# Patient Record
Sex: MALE | Race: WHITE | Employment: UNEMPLOYED | URBAN - METROPOLITAN AREA
[De-identification: names, ages, dates, MRNs, and addresses within clinical notes are randomized per-mention and may not be internally consistent; named-entity substitution may affect disease eponyms.]

---

## 2020-12-18 ENCOUNTER — HOSPITAL ENCOUNTER (INPATIENT)
Age: 40
LOS: 3 days | Discharge: HOME | End: 2020-12-21
Attending: STUDENT IN AN ORGANIZED HEALTH CARE EDUCATION/TRAINING PROGRAM | Admitting: INTERNAL MEDICINE
Payer: COMMERCIAL

## 2020-12-18 DIAGNOSIS — F10.930 ALCOHOL WITHDRAWAL SYNDROME WITHOUT COMPLICATION (HCC): Primary | ICD-10-CM

## 2020-12-18 PROBLEM — F19.10 SUBSTANCE ABUSE (HCC): Status: ACTIVE | Noted: 2020-12-18

## 2020-12-18 PROBLEM — F10.929 ALCOHOL INTOXICATION (HCC): Status: ACTIVE | Noted: 2020-12-18

## 2020-12-18 LAB
ALBUMIN SERPL-MCNC: 3.9 G/DL (ref 3.5–5)
ALBUMIN/GLOB SERPL: 1.1 {RATIO} (ref 1.1–2.2)
ALP SERPL-CCNC: 102 U/L (ref 45–117)
ALT SERPL-CCNC: 114 U/L (ref 12–78)
AMMONIA PLAS-SCNC: 28 UMOL/L
AMPHET UR QL SCN: NEGATIVE
ANION GAP SERPL CALC-SCNC: 5 MMOL/L (ref 5–15)
APPEARANCE UR: CLEAR
AST SERPL-CCNC: 115 U/L (ref 15–37)
BARBITURATES UR QL SCN: NEGATIVE
BASOPHILS # BLD: 0.1 K/UL (ref 0–0.1)
BASOPHILS NFR BLD: 1 % (ref 0–1)
BENZODIAZ UR QL: POSITIVE
BILIRUB SERPL-MCNC: 0.2 MG/DL (ref 0.2–1)
BILIRUB UR QL: NEGATIVE
BUN SERPL-MCNC: 10 MG/DL (ref 6–20)
BUN/CREAT SERPL: 8 (ref 12–20)
CALCIUM SERPL-MCNC: 8.6 MG/DL (ref 8.5–10.1)
CANNABINOIDS UR QL SCN: NEGATIVE
CHLORIDE SERPL-SCNC: 105 MMOL/L (ref 97–108)
CO2 SERPL-SCNC: 32 MMOL/L (ref 21–32)
COCAINE UR QL SCN: NEGATIVE
COLOR UR: ABNORMAL
COMMENT, HOLDF: NORMAL
CREAT SERPL-MCNC: 1.26 MG/DL (ref 0.7–1.3)
DIFFERENTIAL METHOD BLD: ABNORMAL
DRUG SCRN COMMENT,DRGCM: ABNORMAL
EOSINOPHIL # BLD: 0.1 K/UL (ref 0–0.4)
EOSINOPHIL NFR BLD: 1 % (ref 0–7)
ERYTHROCYTE [DISTWIDTH] IN BLOOD BY AUTOMATED COUNT: 13 % (ref 11.5–14.5)
ETHANOL SERPL-MCNC: 225 MG/DL
FOLATE SERPL-MCNC: 9.6 NG/ML (ref 5–21)
GLOBULIN SER CALC-MCNC: 3.7 G/DL (ref 2–4)
GLUCOSE SERPL-MCNC: 107 MG/DL (ref 65–100)
GLUCOSE UR STRIP.AUTO-MCNC: 100 MG/DL
HCT VFR BLD AUTO: 47.3 % (ref 36.6–50.3)
HGB BLD-MCNC: 15.8 G/DL (ref 12.1–17)
HGB UR QL STRIP: NEGATIVE
IMM GRANULOCYTES # BLD AUTO: 0 K/UL (ref 0–0.04)
IMM GRANULOCYTES NFR BLD AUTO: 1 % (ref 0–0.5)
INR PPP: 1 (ref 0.9–1.1)
KETONES UR QL STRIP.AUTO: NEGATIVE MG/DL
LEUKOCYTE ESTERASE UR QL STRIP.AUTO: NEGATIVE
LYMPHOCYTES # BLD: 2.4 K/UL (ref 0.8–3.5)
LYMPHOCYTES NFR BLD: 35 % (ref 12–49)
MAGNESIUM SERPL-MCNC: 2.3 MG/DL (ref 1.6–2.4)
MCH RBC QN AUTO: 33.8 PG (ref 26–34)
MCHC RBC AUTO-ENTMCNC: 33.4 G/DL (ref 30–36.5)
MCV RBC AUTO: 101.3 FL (ref 80–99)
METHADONE UR QL: NEGATIVE
MONOCYTES # BLD: 0.5 K/UL (ref 0–1)
MONOCYTES NFR BLD: 7 % (ref 5–13)
NEUTS SEG # BLD: 3.8 K/UL (ref 1.8–8)
NEUTS SEG NFR BLD: 55 % (ref 32–75)
NITRITE UR QL STRIP.AUTO: NEGATIVE
NRBC # BLD: 0 K/UL (ref 0–0.01)
NRBC BLD-RTO: 0 PER 100 WBC
OPIATES UR QL: NEGATIVE
PCP UR QL: NEGATIVE
PH UR STRIP: 6 [PH] (ref 5–8)
PLATELET # BLD AUTO: 237 K/UL (ref 150–400)
PMV BLD AUTO: 8.5 FL (ref 8.9–12.9)
POTASSIUM SERPL-SCNC: 3.9 MMOL/L (ref 3.5–5.1)
PROT SERPL-MCNC: 7.6 G/DL (ref 6.4–8.2)
PROT UR STRIP-MCNC: NEGATIVE MG/DL
PROTHROMBIN TIME: 10.4 SEC (ref 9–11.1)
RBC # BLD AUTO: 4.67 M/UL (ref 4.1–5.7)
SAMPLES BEING HELD,HOLD: NORMAL
SODIUM SERPL-SCNC: 142 MMOL/L (ref 136–145)
SP GR UR REFRACTOMETRY: 1.01 (ref 1–1.03)
UROBILINOGEN UR QL STRIP.AUTO: 0.2 EU/DL (ref 0.2–1)
VIT B12 SERPL-MCNC: 547 PG/ML (ref 193–986)
WBC # BLD AUTO: 6.8 K/UL (ref 4.1–11.1)

## 2020-12-18 PROCEDURE — 85610 PROTHROMBIN TIME: CPT

## 2020-12-18 PROCEDURE — 65660000000 HC RM CCU STEPDOWN

## 2020-12-18 PROCEDURE — 83735 ASSAY OF MAGNESIUM: CPT

## 2020-12-18 PROCEDURE — 74011000250 HC RX REV CODE- 250: Performed by: STUDENT IN AN ORGANIZED HEALTH CARE EDUCATION/TRAINING PROGRAM

## 2020-12-18 PROCEDURE — 82746 ASSAY OF FOLIC ACID SERUM: CPT

## 2020-12-18 PROCEDURE — 85025 COMPLETE CBC W/AUTO DIFF WBC: CPT

## 2020-12-18 PROCEDURE — 81003 URINALYSIS AUTO W/O SCOPE: CPT

## 2020-12-18 PROCEDURE — 36415 COLL VENOUS BLD VENIPUNCTURE: CPT

## 2020-12-18 PROCEDURE — 99285 EMERGENCY DEPT VISIT HI MDM: CPT

## 2020-12-18 PROCEDURE — 96375 TX/PRO/DX INJ NEW DRUG ADDON: CPT

## 2020-12-18 PROCEDURE — 74011250636 HC RX REV CODE- 250/636: Performed by: STUDENT IN AN ORGANIZED HEALTH CARE EDUCATION/TRAINING PROGRAM

## 2020-12-18 PROCEDURE — 82140 ASSAY OF AMMONIA: CPT

## 2020-12-18 PROCEDURE — 80307 DRUG TEST PRSMV CHEM ANLYZR: CPT

## 2020-12-18 PROCEDURE — 74011250637 HC RX REV CODE- 250/637: Performed by: INTERNAL MEDICINE

## 2020-12-18 PROCEDURE — 96374 THER/PROPH/DIAG INJ IV PUSH: CPT

## 2020-12-18 PROCEDURE — 74011250636 HC RX REV CODE- 250/636: Performed by: INTERNAL MEDICINE

## 2020-12-18 PROCEDURE — 74011250636 HC RX REV CODE- 250/636

## 2020-12-18 PROCEDURE — 80053 COMPREHEN METABOLIC PANEL: CPT

## 2020-12-18 PROCEDURE — 82607 VITAMIN B-12: CPT

## 2020-12-18 RX ORDER — DIAZEPAM 10 MG/2ML
10 INJECTION INTRAMUSCULAR
Status: COMPLETED | OUTPATIENT
Start: 2020-12-18 | End: 2020-12-18

## 2020-12-18 RX ORDER — ENOXAPARIN SODIUM 100 MG/ML
40 INJECTION SUBCUTANEOUS DAILY
Status: DISCONTINUED | OUTPATIENT
Start: 2020-12-19 | End: 2020-12-21 | Stop reason: HOSPADM

## 2020-12-18 RX ORDER — SODIUM CHLORIDE 0.9 % (FLUSH) 0.9 %
5-40 SYRINGE (ML) INJECTION AS NEEDED
Status: DISCONTINUED | OUTPATIENT
Start: 2020-12-18 | End: 2020-12-21 | Stop reason: HOSPADM

## 2020-12-18 RX ORDER — ONDANSETRON 2 MG/ML
4 INJECTION INTRAMUSCULAR; INTRAVENOUS
Status: DISCONTINUED | OUTPATIENT
Start: 2020-12-18 | End: 2020-12-21 | Stop reason: HOSPADM

## 2020-12-18 RX ORDER — ESCITALOPRAM OXALATE 20 MG/1
25 TABLET ORAL DAILY
COMMUNITY

## 2020-12-18 RX ORDER — SODIUM CHLORIDE 0.9 % (FLUSH) 0.9 %
5-40 SYRINGE (ML) INJECTION EVERY 8 HOURS
Status: DISCONTINUED | OUTPATIENT
Start: 2020-12-18 | End: 2020-12-21 | Stop reason: HOSPADM

## 2020-12-18 RX ORDER — DEXMEDETOMIDINE HYDROCHLORIDE 4 UG/ML
.1-1.5 INJECTION, SOLUTION INTRAVENOUS
Status: DISCONTINUED | OUTPATIENT
Start: 2020-12-18 | End: 2020-12-21 | Stop reason: HOSPADM

## 2020-12-18 RX ORDER — ACETAMINOPHEN 650 MG/1
650 SUPPOSITORY RECTAL
Status: DISCONTINUED | OUTPATIENT
Start: 2020-12-18 | End: 2020-12-18

## 2020-12-18 RX ORDER — POLYETHYLENE GLYCOL 3350 17 G/17G
17 POWDER, FOR SOLUTION ORAL DAILY PRN
Status: DISCONTINUED | OUTPATIENT
Start: 2020-12-18 | End: 2020-12-21 | Stop reason: HOSPADM

## 2020-12-18 RX ORDER — ACETAMINOPHEN 325 MG/1
650 TABLET ORAL
Status: DISCONTINUED | OUTPATIENT
Start: 2020-12-18 | End: 2020-12-18

## 2020-12-18 RX ORDER — LANOLIN ALCOHOL/MO/W.PET/CERES
100 CREAM (GRAM) TOPICAL DAILY
Status: DISCONTINUED | OUTPATIENT
Start: 2020-12-19 | End: 2020-12-18

## 2020-12-18 RX ORDER — LORAZEPAM 2 MG/ML
2 INJECTION INTRAMUSCULAR ONCE
Status: DISCONTINUED | OUTPATIENT
Start: 2020-12-18 | End: 2020-12-18

## 2020-12-18 RX ORDER — LORAZEPAM 2 MG/ML
INJECTION INTRAMUSCULAR
Status: COMPLETED
Start: 2020-12-18 | End: 2020-12-18

## 2020-12-18 RX ORDER — LORAZEPAM 2 MG/ML
2 INJECTION INTRAMUSCULAR ONCE
Status: COMPLETED | OUTPATIENT
Start: 2020-12-18 | End: 2020-12-18

## 2020-12-18 RX ORDER — DIAZEPAM 10 MG/2ML
20 INJECTION INTRAMUSCULAR
Status: DISCONTINUED | OUTPATIENT
Start: 2020-12-18 | End: 2020-12-20

## 2020-12-18 RX ORDER — DIAZEPAM 10 MG/2ML
10 INJECTION INTRAMUSCULAR
Status: DISCONTINUED | OUTPATIENT
Start: 2020-12-18 | End: 2020-12-20

## 2020-12-18 RX ORDER — CHLORDIAZEPOXIDE HYDROCHLORIDE 25 MG/1
25 CAPSULE, GELATIN COATED ORAL 3 TIMES DAILY
Status: DISCONTINUED | OUTPATIENT
Start: 2020-12-18 | End: 2020-12-21

## 2020-12-18 RX ORDER — FOLIC ACID 1 MG/1
1 TABLET ORAL DAILY
Status: DISCONTINUED | OUTPATIENT
Start: 2020-12-19 | End: 2020-12-18

## 2020-12-18 RX ORDER — IBUPROFEN 200 MG
1 TABLET ORAL DAILY
Status: DISCONTINUED | OUTPATIENT
Start: 2020-12-18 | End: 2020-12-21 | Stop reason: HOSPADM

## 2020-12-18 RX ORDER — SODIUM CHLORIDE 9 MG/ML
100 INJECTION, SOLUTION INTRAVENOUS CONTINUOUS
Status: DISCONTINUED | OUTPATIENT
Start: 2020-12-18 | End: 2020-12-20

## 2020-12-18 RX ORDER — PROMETHAZINE HYDROCHLORIDE 25 MG/1
12.5 TABLET ORAL
Status: DISCONTINUED | OUTPATIENT
Start: 2020-12-18 | End: 2020-12-21 | Stop reason: HOSPADM

## 2020-12-18 RX ORDER — DEXTROAMPHETAMINE SACCHARATE, AMPHETAMINE ASPARTATE, DEXTROAMPHETAMINE SULFATE AND AMPHETAMINE SULFATE 7.5; 7.5; 7.5; 7.5 MG/1; MG/1; MG/1; MG/1
30 TABLET ORAL
COMMUNITY

## 2020-12-18 RX ORDER — ONDANSETRON 2 MG/ML
4 INJECTION INTRAMUSCULAR; INTRAVENOUS
Status: COMPLETED | OUTPATIENT
Start: 2020-12-18 | End: 2020-12-18

## 2020-12-18 RX ADMIN — Medication 10 ML: at 22:43

## 2020-12-18 RX ADMIN — LORAZEPAM 2 MG: 2 INJECTION INTRAMUSCULAR at 14:53

## 2020-12-18 RX ADMIN — FOLIC ACID: 5 INJECTION, SOLUTION INTRAMUSCULAR; INTRAVENOUS; SUBCUTANEOUS at 16:58

## 2020-12-18 RX ADMIN — CHLORDIAZEPOXIDE HYDROCHLORIDE 25 MG: 25 CAPSULE ORAL at 22:31

## 2020-12-18 RX ADMIN — DIAZEPAM 10 MG: 5 INJECTION, SOLUTION INTRAMUSCULAR; INTRAVENOUS at 20:12

## 2020-12-18 RX ADMIN — LORAZEPAM 2 MG: 2 INJECTION INTRAMUSCULAR; INTRAVENOUS at 14:53

## 2020-12-18 RX ADMIN — SODIUM CHLORIDE 100 ML/HR: 9 INJECTION, SOLUTION INTRAVENOUS at 19:57

## 2020-12-18 RX ADMIN — DIAZEPAM 10 MG: 5 INJECTION, SOLUTION INTRAMUSCULAR; INTRAVENOUS at 17:24

## 2020-12-18 RX ADMIN — CHLORDIAZEPOXIDE HYDROCHLORIDE 25 MG: 25 CAPSULE ORAL at 16:58

## 2020-12-18 RX ADMIN — DIAZEPAM 10 MG: 5 INJECTION, SOLUTION INTRAMUSCULAR; INTRAVENOUS at 15:03

## 2020-12-18 RX ADMIN — ONDANSETRON 4 MG: 2 INJECTION INTRAMUSCULAR; INTRAVENOUS at 15:03

## 2020-12-18 RX ADMIN — Medication 10 ML: at 16:58

## 2020-12-18 NOTE — H&P
[address] [facility] Adult  Hospitalist Group  History and Physical    Primary Care Provider: Yany Grant MD  Date of Service:  12/18/2020    Subjective:     Doc Castano is a 41 y.o. male otherwise healthy, with history of alcohol and cocaine abuse, sober for past 2 years came today with alcohol relapse for possible withdrawal due to intoxication going on with alcohol abuse for 1 week. Apparently patient had divorce with his wife and for last 5-6 months since he started drinking. Patient had DTs once about 5 to 6 months ago. Patient started drinking again about a week ago. Apparently patient has been drinking 25+ drinks per day of wine/liquor and beer with last drink this morning around 9 AM.  Also patient has been using cocaine, last use about 2 days ago. With significant shaking, tremulousness, sweating, nausea, but no vomiting/ diarrhea, some dizziness and muscle cramping since morning, patient decided to come to emergency department for further evaluation and help. Patient has history of DTs without seizures in the past along with hallucination with alcohol withdrawal but denies anything of the sort at this time. Usually his withdrawals last about 5 days. Patient denies any previous ICU hospitalizations for the same however he had multiple hospitalizations for alcohol withdrawal as mentioned before. Patient was resting quietly from the benzodiazepine IV he had received in the ED. Easily arousable. Started feeling tremulous again. Denied any history of liver disease or cirrhosis before. Review of Systems:    A comprehensive review of systems was negative except for that written in the History of Present Illness.      Past Medical History:   Diagnosis Date    ADHD (attention deficit hyperactivity disorder) 9/15/2011    Depression     anxiety      Past Surgical History:   Procedure Laterality Date    HX HEENT      ear     Prior to Admission medications    Medication Sig Start Date End Date Taking? Authorizing Provider   FLUoxetine (PROZAC) 20 mg capsule TAKE ONE CAPSULE BY MOUTH EVERY DAY 3/14/13   Shana Zaragoza MD   methylphenidate (RITALIN) 20 mg tablet Take 1 Tab by mouth two (2) times a day. 9/15/11   Shana Zaragoza MD     Allergies   Allergen Reactions    Sulfa (Sulfonamide Antibiotics) Unknown (comments)      History reviewed. No pertinent family history. SOCIAL HISTORY:  Patient resides at Home. Patient ambulates with no assistance. Smoking history: Everyday smoker, 12 PPD  Alcohol history: Heavy alcohol abuse. Quit 2 years ago. Relapsed about 5 to 6 months ago with some pause in between and again restarted about a week ago. Recently moved from [place] to [place] as his parents live here. Objective:       Physical Exam:   Visit Vitals  BP (!) 141/94   Pulse (!) 121   Temp 97.9 °F (36.6 °C)   Resp 22   Wt 63.5 kg (140 lb)   SpO2 98%   BMI 22.60 kg/m²       General:          Alert, cooperative, no distress, appears stated age. Mild tremulous. HEENT:           Atraumatic, anicteric sclerae, pink conjunctivae                          No oral ulcers, mucosa dry, throat clear, dentition poor  Neck:               Supple, symmetrical  Lungs:             Clear to auscultation bilaterally. No Wheezing or Rhonchi. No rales. Chest wall:      No tenderness  No Accessory muscle use. Heart:              Regular  rhythm,  No  murmur   No edema  Abdomen:        Soft, non-tender. Not distended. Bowel sounds normal  Extremities:     No cyanosis. No clubbing,                            Skin turgor normal, Capillary refill normal  Skin:                Not pale. Not Jaundiced  No rashes. Mild spiders over upper chest  Psych:             Not anxious or agitated.   Neurologic:      Alert, moves all extremities, answers questions appropriately and responds to commands     Cap refill: Brisk, less than 3 seconds  Pulses: 2+, symmetric in all extremities      Data Review: All diagnostic labs and studies have been reviewed. Recent Results (from the past 24 hour(s))   CBC WITH AUTOMATED DIFF    Collection Time: 12/18/20  2:46 PM   Result Value Ref Range    WBC 6.8 4.1 - 11.1 K/uL    RBC 4.67 4.10 - 5.70 M/uL    HGB 15.8 12.1 - 17.0 g/dL    HCT 47.3 36.6 - 50.3 %    .3 (H) 80.0 - 99.0 FL    MCH 33.8 26.0 - 34.0 PG    MCHC 33.4 30.0 - 36.5 g/dL    RDW 13.0 11.5 - 14.5 %    PLATELET 298 868 - 886 K/uL    MPV 8.5 (L) 8.9 - 12.9 FL    NRBC 0.0 0  WBC    ABSOLUTE NRBC 0.00 0.00 - 0.01 K/uL    NEUTROPHILS 55 32 - 75 %    LYMPHOCYTES 35 12 - 49 %    MONOCYTES 7 5 - 13 %    EOSINOPHILS 1 0 - 7 %    BASOPHILS 1 0 - 1 %    IMMATURE GRANULOCYTES 1 (H) 0.0 - 0.5 %    ABS. NEUTROPHILS 3.8 1.8 - 8.0 K/UL    ABS. LYMPHOCYTES 2.4 0.8 - 3.5 K/UL    ABS. MONOCYTES 0.5 0.0 - 1.0 K/UL    ABS. EOSINOPHILS 0.1 0.0 - 0.4 K/UL    ABS. BASOPHILS 0.1 0.0 - 0.1 K/UL    ABS. IMM. GRANS. 0.0 0.00 - 0.04 K/UL    DF AUTOMATED     SAMPLES BEING HELD    Collection Time: 12/18/20  2:46 PM   Result Value Ref Range    SAMPLES BEING HELD 1 miguel     COMMENT        Add-on orders for these samples will be processed based on acceptable specimen integrity and analyte stability, which may vary by analyte.    ETHYL ALCOHOL    Collection Time: 12/18/20  2:46 PM   Result Value Ref Range    ALCOHOL(ETHYL),SERUM 225 (H) <38 MG/DL   METABOLIC PANEL, COMPREHENSIVE    Collection Time: 12/18/20  2:46 PM   Result Value Ref Range    Sodium 142 136 - 145 mmol/L    Potassium 3.9 3.5 - 5.1 mmol/L    Chloride 105 97 - 108 mmol/L    CO2 32 21 - 32 mmol/L    Anion gap 5 5 - 15 mmol/L    Glucose 107 (H) 65 - 100 mg/dL    BUN 10 6 - 20 MG/DL    Creatinine 1.26 0.70 - 1.30 MG/DL    BUN/Creatinine ratio 8 (L) 12 - 20      GFR est AA >60 >60 ml/min/1.73m2    GFR est non-AA >60 >60 ml/min/1.73m2    Calcium 8.6 8.5 - 10.1 MG/DL    Bilirubin, total 0.2 0.2 - 1.0 MG/DL    ALT (SGPT) 114 (H) 12 - 78 U/L    AST (SGOT) 115 (H) 15 - 37 U/L Alk. phosphatase 102 45 - 117 U/L    Protein, total 7.6 6.4 - 8.2 g/dL    Albumin 3.9 3.5 - 5.0 g/dL    Globulin 3.7 2.0 - 4.0 g/dL    A-G Ratio 1.1 1.1 - 2.2     MAGNESIUM    Collection Time: 12/18/20  2:46 PM   Result Value Ref Range    Magnesium 2.3 1.6 - 2.4 mg/dL     Radiology  No results found. Assessment:     Active Problems:    Alcohol intoxication ([facility] [address] (12/18/2020)      Substance abuse ([facility] (12/18/2020)      #Heavy alcohol use, intoxication with withdrawal symptoms since morning. Last drink this morning  #Cocaine abuse, last 2 days ago  #Social distress due to recent divorce  #Elevated transaminases, likely alcoholic hepatitis related  #Macrocytosis    Plan:     -Admit to inpatient, telemetry, low threshold for transfer to higher level of careICU if patient continues to withdrawal heavily and not controlled with IV benzodiazepine boluses  -CIWA protocol with as needed Valium  -Schedule Librium 25 mg p.o. 3 times daily  -Banana bag IV with multivitamin 100 mL/h along with normal saline at 100 mL/h  -UDS  -CBC, CMP, mag, Phos daily  -If uncontrolled, may need IV Precedex gtt. with transfer to ICU for close monitoring  -Check B12, folate levels  -Nicotine patch  -Case management consultation, psychiatry consultation  -PT/OT  -Avoid beta-blocker  -May need as needed blood pressure medications. Will consider hydralazine IV 10 mg every 6 hours as needed for SBP greater than 170    CODE STATUS: Full code  DVT prophylaxis: Lovenox  MPOA/emergency contact: Mother, Ms. Chioma Ojeda [phone].     FUNCTIONAL STATUS PRIOR TO HOSPITALIZATION Ambulates Independently (including history of recent falls)     Signed By: Néstor Hansen MD     December 18, 2020

## 2020-12-18 NOTE — ED PROVIDER NOTES
Patient is a 68-year-old male presenting today with follow-up. Patient is an alcoholic but was sober for 2 years before 1 week ago. he and his wife got a divorce 1 week ago and since then he has been drinking around-the-clock. States he has 25+ drinks per day of alcohol (wine/liquor and beer). Patient had his last drink this morning at 9 AM.  He has also been using cocaine, last used 2 days ago. He reports severe shaking, diaphoresis, nausea, vomiting, diarrhea, dizziness and muscle cramping since this morning. Has history of seizures and hallucinations in the past but denies these today. Past Medical History:   Diagnosis Date    ADHD (attention deficit hyperactivity disorder) 9/15/2011    Depression     anxiety       Past Surgical History:   Procedure Laterality Date    HX HEENT      ear         History reviewed. No pertinent family history. Social History     Socioeconomic History    Marital status: SINGLE     Spouse name: Not on file    Number of children: Not on file    Years of education: Not on file    Highest education level: Not on file   Occupational History    Not on file   Social Needs    Financial resource strain: Not on file    Food insecurity     Worry: Not on file     Inability: Not on file    Transportation needs     Medical: Not on file     Non-medical: Not on file   Tobacco Use    Smoking status: Current Every Day Smoker    Smokeless tobacco: Former User   Substance and Sexual Activity    Alcohol use:  Yes    Drug use: No    Sexual activity: Not on file   Lifestyle    Physical activity     Days per week: Not on file     Minutes per session: Not on file    Stress: Not on file   Relationships    Social connections     Talks on phone: Not on file     Gets together: Not on file     Attends Scientologist service: Not on file     Active member of club or organization: Not on file     Attends meetings of clubs or organizations: Not on file     Relationship status: Not on file    Intimate partner violence     Fear of current or ex partner: Not on file     Emotionally abused: Not on file     Physically abused: Not on file     Forced sexual activity: Not on file   Other Topics Concern    Not on file   Social History Narrative    Not on file         ALLERGIES: Sulfa (sulfonamide antibiotics)    Review of Systems   Constitutional: Positive for chills and diaphoresis. Negative for fever. HENT: Negative for congestion and sore throat. Eyes: Negative for pain and redness. Respiratory: Negative for cough and shortness of breath. Cardiovascular: Positive for palpitations. Negative for chest pain. Gastrointestinal: Positive for diarrhea, nausea and vomiting. Negative for abdominal pain. Genitourinary: Negative for frequency and hematuria. Musculoskeletal: Negative for back pain and neck pain. Skin: Negative for rash and wound. Neurological: Positive for dizziness. Negative for headaches. Hematological: Does not bruise/bleed easily. Vitals:    12/18/20 1429 12/18/20 1431   Pulse: (!) 121    Resp: 22    Temp:  97.9 °F (36.6 °C)   SpO2: 98%             Physical Exam  Vitals signs and nursing note reviewed. Constitutional:       General: He is not in acute distress. Appearance: He is well-developed. He is ill-appearing and diaphoretic. HENT:      Head: Normocephalic and atraumatic. Eyes:      Conjunctiva/sclera: Conjunctivae normal.      Pupils: Pupils are equal, round, and reactive to light. Neck:      Musculoskeletal: Normal range of motion and neck supple. Cardiovascular:      Rate and Rhythm: Regular rhythm. Tachycardia present. Heart sounds: Normal heart sounds. No murmur. No friction rub. No gallop. Pulmonary:      Effort: Pulmonary effort is normal. No respiratory distress. Breath sounds: Normal breath sounds. No wheezing or rales. Abdominal:      General: Bowel sounds are normal. There is no distension.       Palpations: Abdomen is soft.      Tenderness: There is no abdominal tenderness. There is no guarding or rebound. Musculoskeletal: Normal range of motion. Comments: Spasming of feet and wrists   Skin:     General: Skin is warm. Capillary Refill: Capillary refill takes less than 2 seconds. Findings: No rash. Neurological:      Mental Status: He is alert and oriented to person, place, and time. Psychiatric:      Comments: Very anxious  Tremulous          Labs Reviewed:   No leukocytosis  No anemia  Alcohol elevated to 225  Renal function and electrolytes look okay  Mild elevation in LFTs    Imaging Reviewed: N/A      Course:  Ativan 2mg IV given without improvement  Valium 10mg IV ordered. Attempted to order Precedex however no weight in chart yet. 4:04 PM patient still with anxiety, nausea, tremor. Appears much better. HR improved. Will hold Precedex for right now, give additional ativan.     4:10 PM prior to him getting ativan I re-evaluated. He is sleepy. HR ok. Will hold for now. 4:18 PM pt re-evaluated. Feeling much better. Sleepy but easily wakes with verbal stim. Perfect Serve Consult for Admission  4:18 PM    ED Room Number: ER10/10  Patient Name and age:  July Bravo 41 y.o.  male  Working Diagnosis:   1. Alcohol withdrawal syndrome without complication ([facility] [address]        COVID-19 Suspicion:  no  Sepsis present:  no  Reassessment needed: no  Code Status:  Full Code  Readmission: no  Isolation Requirements:  no  Recommended Level of Care:  step down  Department:[facility] Adult ED - 21   Other:  41 y.o. male etoh w/d. Received ativan 2mg + valium 10mg IV. Appears much better now. MDM:  68-year-old male presenting with concern for alcohol withdrawal.  He has been drinking heavily for the past 1 week. Has prior history of seizures and hallucinations but not today. He is given Ativan without significant provement followed by Valium 10 mg IV which did seem to help.   I had initially ordered Precedex but he did not end up requiring it. Patient's vital signs improved drastically with medications as above. He received a banana bag but did not require any other electrolyte replacement. He will be admitted to the hospital service for further management. Clinical Impression:     ICD-10-CM ICD-9-CM    1.  Alcohol withdrawal syndrome without complication (HCC)  Q94.102 291.81            Disposition: Admit  Bruno Javier,

## 2020-12-18 NOTE — ED TRIAGE NOTES
Patient reports symptoms of etoh withdrawal.  Rapid heart rate, shaking, nausea.  Last drink at 9 am this morning

## 2020-12-18 NOTE — ED NOTES
Upon entering pt room to start precedex, pt found asleep. Pt awoke after loud verbal stimuli. Dr. Dion Bernardo notified and precedex held.

## 2020-12-19 ENCOUNTER — APPOINTMENT (OUTPATIENT)
Dept: ULTRASOUND IMAGING | Age: 40
End: 2020-12-19
Attending: INTERNAL MEDICINE
Payer: COMMERCIAL

## 2020-12-19 LAB
ALBUMIN SERPL-MCNC: 3.2 G/DL (ref 3.5–5)
ALBUMIN/GLOB SERPL: 1.1 {RATIO} (ref 1.1–2.2)
ALP SERPL-CCNC: 84 U/L (ref 45–117)
ALT SERPL-CCNC: 85 U/L (ref 12–78)
ANION GAP SERPL CALC-SCNC: 6 MMOL/L (ref 5–15)
AST SERPL-CCNC: 77 U/L (ref 15–37)
BILIRUB SERPL-MCNC: 0.5 MG/DL (ref 0.2–1)
BUN SERPL-MCNC: 8 MG/DL (ref 6–20)
BUN/CREAT SERPL: 9 (ref 12–20)
CALCIUM SERPL-MCNC: 7.7 MG/DL (ref 8.5–10.1)
CHLORIDE SERPL-SCNC: 110 MMOL/L (ref 97–108)
CO2 SERPL-SCNC: 26 MMOL/L (ref 21–32)
CREAT SERPL-MCNC: 0.88 MG/DL (ref 0.7–1.3)
ERYTHROCYTE [DISTWIDTH] IN BLOOD BY AUTOMATED COUNT: 13.1 % (ref 11.5–14.5)
GLOBULIN SER CALC-MCNC: 2.9 G/DL (ref 2–4)
GLUCOSE SERPL-MCNC: 86 MG/DL (ref 65–100)
HCT VFR BLD AUTO: 38.9 % (ref 36.6–50.3)
HGB BLD-MCNC: 13.3 G/DL (ref 12.1–17)
MAGNESIUM SERPL-MCNC: 1.8 MG/DL (ref 1.6–2.4)
MCH RBC QN AUTO: 33.9 PG (ref 26–34)
MCHC RBC AUTO-ENTMCNC: 34.2 G/DL (ref 30–36.5)
MCV RBC AUTO: 99.2 FL (ref 80–99)
NRBC # BLD: 0 K/UL (ref 0–0.01)
NRBC BLD-RTO: 0 PER 100 WBC
PHOSPHATE SERPL-MCNC: 2.6 MG/DL (ref 2.6–4.7)
PLATELET # BLD AUTO: 158 K/UL (ref 150–400)
PMV BLD AUTO: 8.5 FL (ref 8.9–12.9)
POTASSIUM SERPL-SCNC: 3.7 MMOL/L (ref 3.5–5.1)
PROT SERPL-MCNC: 6.1 G/DL (ref 6.4–8.2)
RBC # BLD AUTO: 3.92 M/UL (ref 4.1–5.7)
SODIUM SERPL-SCNC: 142 MMOL/L (ref 136–145)
WBC # BLD AUTO: 5.2 K/UL (ref 4.1–11.1)

## 2020-12-19 PROCEDURE — 65660000000 HC RM CCU STEPDOWN

## 2020-12-19 PROCEDURE — 84100 ASSAY OF PHOSPHORUS: CPT

## 2020-12-19 PROCEDURE — 83735 ASSAY OF MAGNESIUM: CPT

## 2020-12-19 PROCEDURE — 74011250637 HC RX REV CODE- 250/637: Performed by: INTERNAL MEDICINE

## 2020-12-19 PROCEDURE — 74011250636 HC RX REV CODE- 250/636: Performed by: INTERNAL MEDICINE

## 2020-12-19 PROCEDURE — 36415 COLL VENOUS BLD VENIPUNCTURE: CPT

## 2020-12-19 PROCEDURE — 76705 ECHO EXAM OF ABDOMEN: CPT

## 2020-12-19 PROCEDURE — 80053 COMPREHEN METABOLIC PANEL: CPT

## 2020-12-19 PROCEDURE — 85027 COMPLETE CBC AUTOMATED: CPT

## 2020-12-19 PROCEDURE — 74011000250 HC RX REV CODE- 250: Performed by: INTERNAL MEDICINE

## 2020-12-19 RX ORDER — FLUOXETINE HYDROCHLORIDE 20 MG/1
20 CAPSULE ORAL DAILY
Status: DISCONTINUED | OUTPATIENT
Start: 2020-12-19 | End: 2020-12-21 | Stop reason: HOSPADM

## 2020-12-19 RX ADMIN — CHLORDIAZEPOXIDE HYDROCHLORIDE 25 MG: 25 CAPSULE ORAL at 16:55

## 2020-12-19 RX ADMIN — Medication 10 ML: at 06:13

## 2020-12-19 RX ADMIN — ENOXAPARIN SODIUM 40 MG: 100 INJECTION SUBCUTANEOUS at 10:23

## 2020-12-19 RX ADMIN — DIAZEPAM 10 MG: 5 INJECTION, SOLUTION INTRAMUSCULAR; INTRAVENOUS at 03:23

## 2020-12-19 RX ADMIN — CHLORDIAZEPOXIDE HYDROCHLORIDE 25 MG: 25 CAPSULE ORAL at 22:59

## 2020-12-19 RX ADMIN — DIAZEPAM 10 MG: 5 INJECTION, SOLUTION INTRAMUSCULAR; INTRAVENOUS at 20:37

## 2020-12-19 RX ADMIN — THIAMINE HYDROCHLORIDE: 100 INJECTION, SOLUTION INTRAMUSCULAR; INTRAVENOUS at 10:27

## 2020-12-19 RX ADMIN — FLUOXETINE 20 MG: 20 CAPSULE ORAL at 10:24

## 2020-12-19 RX ADMIN — ONDANSETRON 4 MG: 2 INJECTION INTRAMUSCULAR; INTRAVENOUS at 03:22

## 2020-12-19 RX ADMIN — SODIUM CHLORIDE 100 ML/HR: 9 INJECTION, SOLUTION INTRAVENOUS at 06:12

## 2020-12-19 RX ADMIN — CHLORDIAZEPOXIDE HYDROCHLORIDE 25 MG: 25 CAPSULE ORAL at 10:24

## 2020-12-19 NOTE — PROGRESS NOTES
Orders received and chart reviewed. Per nurse, pt is young and was walking independently prior to admission. He has been mobile while here, and she does not anticipate he will have issues resuming his prior abilities. Order completed at this time. Please reconsult if pt has a decline in function or demonstrates decreased balance once up. Recommend OOB 3x/day for meals and to restroom. Thank you.

## 2020-12-19 NOTE — PROGRESS NOTES
Problem: Falls - Risk of  Goal: *Absence of Falls  Description: Document Artur Nelson Fall Risk and appropriate interventions in the flowsheet.   12/18/2020 2213 by Shawna Chan  Outcome: Progressing Towards Goal  Note: Fall Risk Interventions:  Mobility Interventions: Patient to call before getting OOB         Medication Interventions: Bed/chair exit alarm, Patient to call before getting OOB                12/18/2020 2212 by Johanna DHALIWAL  Outcome: Progressing Towards Goal  Note: Fall Risk Interventions:  Mobility Interventions: Patient to call before getting OOB         Medication Interventions: Bed/chair exit alarm, Patient to call before getting OOB                   Problem: Patient Education: Go to Patient Education Activity  Goal: Patient/Family Education  Outcome: Progressing Towards Goal     Problem: Alcohol Withdrawal  Goal: *STG: Participates in treatment plan  Outcome: Progressing Towards Goal  Goal: *STG: Remains safe in hospital  Outcome: Progressing Towards Goal  Goal: *STG: Seeks staff when symptoms of withdrawal increase  Outcome: Progressing Towards Goal  Goal: *STG: Complies with medication therapy  Outcome: Progressing Towards Goal  Goal: *STG: Will identify negative impact of chemical dependency including the use of tobacco, alcohol, and other substances  Outcome: Progressing Towards Goal  Goal: *STG: Verbalizes abstinence as an achievable goal  Outcome: Progressing Towards Goal  Goal: *STG: Able to indentify relapse triggers including interpersonal/social and familial factors  Outcome: Progressing Towards Goal  Goal: *STG: Identify lifestyle changes to support long term sobriety such as vocation, employment, education, and legal issues  Outcome: Progressing Towards Goal  Goal: *STG: Maintains appropriate nutrition and hydration  Outcome: Progressing Towards Goal  Goal: *STG: Vital signs within defined limits  Outcome: Progressing Towards Goal  Goal: *STG/LTG: Relapse prevention plan in place to include housing/aftercare, leisure activities, and spirituality  Outcome: Progressing Towards Goal  Goal: Interventions  Outcome: Progressing Towards Goal     Problem: Patient Education: Go to Patient Education Activity  Goal: Patient/Family Education  Outcome: Progressing Towards Goal

## 2020-12-19 NOTE — PROGRESS NOTES
[address] [facility] Adult  Hospitalist Group                                                                                          Hospitalist Progress Note  Sangita Lo MD  Answering service: [phone] from in house phone        Date of Service:  2020  NAME:  Carlos Manuel Cifuentes  :  1980  MRN:  [id number]      Admission Summary:   Carlos Manuel Cifuentes is a 41 y.o. male otherwise healthy, with history of alcohol and cocaine abuse, sober for past 2 years came today with alcohol relapse for possible withdrawal due to intoxication going on with alcohol abuse for 1 week. Apparently patient had divorce with his wife and for last 5-6 months since he started drinking. Patient had DTs once about 5 to 6 months ago. Patient started drinking again about a week ago. Apparently patient has been drinking 25+ drinks per day of wine/liquor and beer with last drink this morning around 9 AM.  Also patient has been using cocaine, last use about 2 days ago. With significant shaking, tremulousness, sweating, nausea, but no vomiting/ diarrhea, some dizziness and muscle cramping since morning, patient decided to come to emergency department for further evaluation and help. Patient has history of DTs without seizures in the past along with hallucination with alcohol withdrawal but denies anything of the sort at this time. Usually his withdrawals last about 5 days. Patient denies any previous ICU hospitalizations for the same however he had multiple hospitalizations for alcohol withdrawal as mentioned before. Patient was resting quietly from the benzodiazepine IV he had received in the ED. Easily arousable. Started feeling tremulous again. Denied any history of liver disease or cirrhosis before. Interval history / Subjective: Follow up alcohol intoxication, withdrawal.  Patient seen and examined at the bedside. Labs, images and notes reviewed  Discussed with nursing staff, orders reviewed. Plan discussed with patient/Family  Feeling okay. Needed 3 doses of as needed Valium per CIWA. Less tremulous. No appetite. Some nausea but no vomiting. Willing to try CLD. Assessment & Plan:     # Heavy alcohol use, intoxication with withdrawal symptoms since morning of admission. Last drink 12/18/2020 a.m.  -Admitted to inpatient, telemetry  -CIWA protocol with as needed Valium  -Continue Librium 25 mg 3 times daily and taper once clinically stable  -Continue banana bag IV along with IVF normal saline  -Daily CBC, CMP, Mag, Phos monitoringreplete as needed  -If worsens, low threshold for transfer to ICU for IV Precedex gtt. -CM consult for disposition planning  -Psych consult  -Patient already has a sponsor from [facility] where he moved from recently and is in communication virtually/via phone    # Cocaine abuse, last 2 days ago before admission  -UDS unremarkable  -Counseled for abstinence and cessation.   Patient voiced understanding    # Nicotine dependence, daily 12 PPD smoking  -Nicotine replacement therapy/nicotine patch 21 mg / 24 hours  -Counseled for cessation    #Social distress due to recent divorce  -We will await psychiatry input    #Elevated transaminases, likely alcoholic hepatitis related  -Downtrending  -Consult for alcohol abstinence  -CMP monitoring  -RUQ ultrasound    #Macrocytosis, due to alcohol use  -B12 and folate WNL  -MVI replacement, thiamine, folate replacement p.o. once stable    #Mild acute renal insufficiency, improved  -Better with IVF  -Maintain good hydration and encourage p.o. intake    Code status: Full code  DVT prophylaxis: Lovenox    Care Plan discussed with: Patient/Family and Nurse  Anticipated Disposition: Home w/Family  Anticipated Discharge: Greater than 48 hours     Hospital Problems  Date Reviewed: 9/15/2011          Codes Class Noted POA    Alcohol intoxication ([facility] [address] ICD-10-CM: D54.405  ICD-9-CM: 305.00  12/18/2020 Unknown        Substance abuse [facility]) ICD-10-CM: F19.10  ICD-9-CM: 305.90  12/18/2020 Unknown                Review of Systems:   A comprehensive review of systems was negative except for that written in the HPI. Vital Signs:    Last 24hrs VS reviewed since prior progress note. Most recent are:  Visit Vitals  /87 (BP 1 Location: Left arm, BP Patient Position: At rest)   Pulse 76   Temp 98.3 °F (36.8 °C)   Resp 18   Wt 75.5 kg (166 lb 7.2 oz)   SpO2 93%   BMI 26.87 kg/m²         Intake/Output Summary (Last 24 hours) at 12/19/2020 0776  Last data filed at 12/19/2020 0441  Gross per 24 hour   Intake    Output 1200 ml   Net -1200 ml        Physical Examination:     I had a face to face encounter with this patient and independently examined them on 12/19/2020 as outlined below:          General:          Alert, cooperative, no distress, appears stated age.    Mild tremulous. HEENT:           Atraumatic, anicteric sclerae, pink conjunctivae                          No oral ulcers, mucosa dry, throat clear, dentition poor  Neck:               Supple, symmetrical  Lungs:             Clear to auscultation bilaterally.  No Wheezing or Rhonchi. No rales. Chest wall:      No tenderness  No Accessory muscle use. Heart:              Regular  rhythm,  No  murmur   No edema  Abdomen:        Soft, non-tender. Not distended.  Bowel sounds normal  Extremities:     No cyanosis.  No clubbing,                            Skin turgor normal, Capillary refill normal  Skin:                Not pale.  Not Jaundiced  No rashes. Mild spiders over upper chest  Psych:             Not anxious or agitated. Neurologic:      Alert, moves all extremities, answers questions appropriately and responds to commands        Data Review:   Review and/or order of clinical lab test  Review and/or order of tests in the radiology section of CPT  Review and/or order of tests in the medicine section of CPT    No results found.     Labs:     Recent Labs     12/19/20  3114 12/18/20  1446   WBC 5.2 6.8   HGB 13.3 15.8   HCT 38.9 47.3    237     Recent Labs     12/19/20  0432 12/18/20  1446    142   K 3.7 3.9   * 105   CO2 26 32   BUN 8 10   CREA 0.88 1.26   GLU 86 107*   CA 7.7* 8.6   MG 1.8 2.3   PHOS 2.6  --      Recent Labs     12/19/20  0432 12/18/20  1446   ALT 85* 114*   AP 84 102   TBILI 0.5 0.2   TP 6.1* 7.6   ALB 3.2* 3.9   GLOB 2.9 3.7     Recent Labs     12/18/20  1446   INR 1.0   PTP 10.4      No results for input(s): FE, TIBC, PSAT, FERR in the last 72 hours. Lab Results   Component Value Date/Time    Folate 9.6 12/18/2020 02:46 PM      No results for input(s): PH, PCO2, PO2 in the last 72 hours. No results for input(s): CPK, CKNDX, TROIQ in the last 72 hours.     No lab exists for component: CPKMB  No results found for: CHOL, CHOLX, CHLST, CHOLV, HDL, HDLP, LDL, LDLC, DLDLP, TGLX, TRIGL, TRIGP, CHHD, CHHDX  No results found for: [facility]  Lab Results   Component Value Date/Time    Color YELLOW/STRAW 12/18/2020 07:34 PM    Appearance CLEAR 12/18/2020 07:34 PM    Specific gravity 1.015 12/18/2020 07:34 PM    pH (UA) 6.0 12/18/2020 07:34 PM    Protein Negative 12/18/2020 07:34 PM    Glucose 100 (A) 12/18/2020 07:34 PM    Ketone Negative 12/18/2020 07:34 PM    Bilirubin Negative 12/18/2020 07:34 PM    Urobilinogen 0.2 12/18/2020 07:34 PM    Nitrites Negative 12/18/2020 07:34 PM    Leukocyte Esterase Negative 12/18/2020 07:34 PM         Medications Reviewed:     Current Facility-Administered Medications   Medication Dose Route Frequency    FLUoxetine (PROzac) capsule 20 mg  20 mg Oral DAILY    [Held by provider] dexmedeTOMidine in 0.9 % NaCl (PRECEDEX) 400 mcg/100 mL (4 mcg/mL) infusion soln  0.1-1.5 mcg/kg/hr IntraVENous TITRATE    sodium chloride (NS) flush 5-40 mL  5-40 mL IntraVENous Q8H    sodium chloride (NS) flush 5-40 mL  5-40 mL IntraVENous PRN    polyethylene glycol (MIRALAX) packet 17 g  17 g Oral DAILY PRN    promethazine (PHENERGAN) tablet 12.5 mg  12.5 mg Oral Q6H PRN    Or    ondansetron (ZOFRAN) injection 4 mg  4 mg IntraVENous Q6H PRN    enoxaparin (LOVENOX) injection 40 mg  40 mg SubCUTAneous DAILY    diazePAM (VALIUM) injection 10 mg  10 mg IntraVENous Q1H PRN    diazePAM (VALIUM) injection 20 mg  20 mg IntraVENous Q1H PRN    0.9% sodium chloride 7,285 mL with folic acid 1 mg, thiamine 100 mg, mvi, adult no. 4 with vit K 10 mL infusion   IntraVENous DAILY    prochlorperazine (COMPAZINE) with saline injection 5 mg  5 mg IntraVENous Q6H PRN    chlordiazePOXIDE (LIBRIUM) capsule 25 mg  25 mg Oral TID    0.9% sodium chloride infusion  100 mL/hr IntraVENous CONTINUOUS    nicotine (NICODERM CQ) 21 mg/24 hr patch 1 Patch  1 Patch TransDERmal DAILY     ______________________________________________________________________  EXPECTED LENGTH OF STAY: - - -  ACTUAL LENGTH OF STAY:          1                 Francie Arceo MD

## 2020-12-19 NOTE — PROGRESS NOTES
Bedside and Verbal shift change report given to [facility] (oncoming nurse) by Darlene Ruiz (offgoing nurse). Report included the following information SBAR, Kardex, ED Summary, MAR, Recent Results and Cardiac Rhythm NSR.

## 2020-12-19 NOTE — ROUTINE PROCESS
TRANSFER - OUT REPORT: 
 
Verbal report given to RN(name) on Trish Loco  being transferred to (unit) for routine progression of care Report consisted of patients Situation, Background, Assessment and  
Recommendations(SBAR). Information from the following report(s) SBAR, ED Summary, MAR, Recent Results and Cardiac Rhythm SR was reviewed with the receiving nurse. Lines:  
Peripheral IV 12/18/20 Left Antecubital (Active) Site Assessment Clean, dry, & intact 12/18/20 1449 Phlebitis Assessment 0 12/18/20 1449 Infiltration Assessment 0 12/18/20 1449 Dressing Status Clean, dry, & intact 12/18/20 1449 Dressing Type Transparent 12/18/20 1449 Hub Color/Line Status Pink;Flushed;Patent 12/18/20 1449 Action Taken Blood drawn 12/18/20 1449 Opportunity for questions and clarification was provided. Patient transported with: 
 [facility] Miladys Rodrigez RN

## 2020-12-19 NOTE — PROGRESS NOTES
Report received from Tashia Farr on Mr. Hess being received from ED. Patient still in ED at change of shift. Bedside shift change report given to William Staff and [place] rn(oncoming nurse) by Ronit Rowell (offgoing nurse). Report included the following information SBAR, Kardex, ED Summary, Intake/Output, MAR, Recent Results, Med Rec Status and Cardiac Rhythm NSR.

## 2020-12-20 LAB
ALBUMIN SERPL-MCNC: 3.4 G/DL (ref 3.5–5)
ALBUMIN/GLOB SERPL: 1.2 {RATIO} (ref 1.1–2.2)
ALP SERPL-CCNC: 97 U/L (ref 45–117)
ALT SERPL-CCNC: 98 U/L (ref 12–78)
ANION GAP SERPL CALC-SCNC: 6 MMOL/L (ref 5–15)
AST SERPL-CCNC: 86 U/L (ref 15–37)
BILIRUB SERPL-MCNC: 0.4 MG/DL (ref 0.2–1)
BUN SERPL-MCNC: 9 MG/DL (ref 6–20)
BUN/CREAT SERPL: 10 (ref 12–20)
CALCIUM SERPL-MCNC: 8.3 MG/DL (ref 8.5–10.1)
CHLORIDE SERPL-SCNC: 106 MMOL/L (ref 97–108)
CO2 SERPL-SCNC: 26 MMOL/L (ref 21–32)
CREAT SERPL-MCNC: 0.89 MG/DL (ref 0.7–1.3)
ERYTHROCYTE [DISTWIDTH] IN BLOOD BY AUTOMATED COUNT: 12.7 % (ref 11.5–14.5)
GLOBULIN SER CALC-MCNC: 2.9 G/DL (ref 2–4)
GLUCOSE SERPL-MCNC: 146 MG/DL (ref 65–100)
HCT VFR BLD AUTO: 40.9 % (ref 36.6–50.3)
HGB BLD-MCNC: 14.3 G/DL (ref 12.1–17)
MAGNESIUM SERPL-MCNC: 2 MG/DL (ref 1.6–2.4)
MCH RBC QN AUTO: 33.6 PG (ref 26–34)
MCHC RBC AUTO-ENTMCNC: 35 G/DL (ref 30–36.5)
MCV RBC AUTO: 96.2 FL (ref 80–99)
NRBC # BLD: 0 K/UL (ref 0–0.01)
NRBC BLD-RTO: 0 PER 100 WBC
PHOSPHATE SERPL-MCNC: 2.1 MG/DL (ref 2.6–4.7)
PLATELET # BLD AUTO: 160 K/UL (ref 150–400)
PMV BLD AUTO: 8.4 FL (ref 8.9–12.9)
POTASSIUM SERPL-SCNC: 3.4 MMOL/L (ref 3.5–5.1)
PROT SERPL-MCNC: 6.3 G/DL (ref 6.4–8.2)
RBC # BLD AUTO: 4.25 M/UL (ref 4.1–5.7)
SODIUM SERPL-SCNC: 138 MMOL/L (ref 136–145)
WBC # BLD AUTO: 5.1 K/UL (ref 4.1–11.1)

## 2020-12-20 PROCEDURE — 83735 ASSAY OF MAGNESIUM: CPT

## 2020-12-20 PROCEDURE — 80053 COMPREHEN METABOLIC PANEL: CPT

## 2020-12-20 PROCEDURE — 85027 COMPLETE CBC AUTOMATED: CPT

## 2020-12-20 PROCEDURE — 36415 COLL VENOUS BLD VENIPUNCTURE: CPT

## 2020-12-20 PROCEDURE — 74011250637 HC RX REV CODE- 250/637: Performed by: INTERNAL MEDICINE

## 2020-12-20 PROCEDURE — 74011250636 HC RX REV CODE- 250/636: Performed by: INTERNAL MEDICINE

## 2020-12-20 PROCEDURE — 84100 ASSAY OF PHOSPHORUS: CPT

## 2020-12-20 PROCEDURE — 65660000000 HC RM CCU STEPDOWN

## 2020-12-20 RX ORDER — LANOLIN ALCOHOL/MO/W.PET/CERES
100 CREAM (GRAM) TOPICAL DAILY
Status: DISCONTINUED | OUTPATIENT
Start: 2020-12-20 | End: 2020-12-21 | Stop reason: HOSPADM

## 2020-12-20 RX ORDER — DIAZEPAM 10 MG/2ML
5 INJECTION INTRAMUSCULAR
Status: DISCONTINUED | OUTPATIENT
Start: 2020-12-20 | End: 2020-12-21 | Stop reason: HOSPADM

## 2020-12-20 RX ORDER — POTASSIUM CHLORIDE 750 MG/1
40 TABLET, FILM COATED, EXTENDED RELEASE ORAL
Status: COMPLETED | OUTPATIENT
Start: 2020-12-20 | End: 2020-12-20

## 2020-12-20 RX ORDER — FOLIC ACID 1 MG/1
1 TABLET ORAL DAILY
Status: DISCONTINUED | OUTPATIENT
Start: 2020-12-20 | End: 2020-12-21 | Stop reason: HOSPADM

## 2020-12-20 RX ORDER — DIAZEPAM 10 MG/2ML
10 INJECTION INTRAMUSCULAR
Status: DISCONTINUED | OUTPATIENT
Start: 2020-12-20 | End: 2020-12-21 | Stop reason: HOSPADM

## 2020-12-20 RX ADMIN — DIAZEPAM 10 MG: 5 INJECTION, SOLUTION INTRAMUSCULAR; INTRAVENOUS at 06:58

## 2020-12-20 RX ADMIN — SODIUM CHLORIDE 100 ML/HR: 9 INJECTION, SOLUTION INTRAVENOUS at 04:03

## 2020-12-20 RX ADMIN — CHLORDIAZEPOXIDE HYDROCHLORIDE 25 MG: 25 CAPSULE ORAL at 22:31

## 2020-12-20 RX ADMIN — FLUOXETINE 20 MG: 20 CAPSULE ORAL at 10:09

## 2020-12-20 RX ADMIN — FOLIC ACID 1 MG: 1 TABLET ORAL at 10:09

## 2020-12-20 RX ADMIN — DIAZEPAM 5 MG: 5 INJECTION, SOLUTION INTRAMUSCULAR; INTRAVENOUS at 17:59

## 2020-12-20 RX ADMIN — CHLORDIAZEPOXIDE HYDROCHLORIDE 25 MG: 25 CAPSULE ORAL at 16:06

## 2020-12-20 RX ADMIN — ENOXAPARIN SODIUM 40 MG: 100 INJECTION SUBCUTANEOUS at 10:08

## 2020-12-20 RX ADMIN — CHLORDIAZEPOXIDE HYDROCHLORIDE 25 MG: 25 CAPSULE ORAL at 10:09

## 2020-12-20 RX ADMIN — POTASSIUM CHLORIDE 40 MEQ: 750 TABLET, FILM COATED, EXTENDED RELEASE ORAL at 17:39

## 2020-12-20 RX ADMIN — DIAZEPAM 10 MG: 5 INJECTION, SOLUTION INTRAMUSCULAR; INTRAVENOUS at 04:03

## 2020-12-20 RX ADMIN — Medication 100 MG: at 10:09

## 2020-12-20 RX ADMIN — DIAZEPAM 5 MG: 5 INJECTION, SOLUTION INTRAMUSCULAR; INTRAVENOUS at 10:19

## 2020-12-20 RX ADMIN — DIAZEPAM 5 MG: 5 INJECTION, SOLUTION INTRAMUSCULAR; INTRAVENOUS at 11:49

## 2020-12-20 RX ADMIN — POTASSIUM CHLORIDE 40 MEQ: 750 TABLET, FILM COATED, EXTENDED RELEASE ORAL at 15:03

## 2020-12-20 RX ADMIN — Medication 10 ML: at 15:04

## 2020-12-20 NOTE — PROGRESS NOTES
[facility] [facility]  Hospitalist Group                                                                                          Hospitalist Progress Note  Jody Maxwell MD  Answering service: [phone] from in house phone        Date of Service:  2020  NAME:  Laura Porter  :  1980  MRN:  [id number]      Admission Summary:   Laura Porter is a 41 y.o. male otherwise healthy, with history of alcohol and cocaine abuse, sober for past 2 years came today with alcohol relapse for possible withdrawal due to intoxication going on with alcohol abuse for 1 week. Apparently patient had divorce with his wife and for last 5-6 months since he started drinking. Patient had DTs once about 5 to 6 months ago. Patient started drinking again about a week ago. Apparently patient has been drinking 25+ drinks per day of wine/liquor and beer with last drink this morning around 9 AM.  Also patient has been using cocaine, last use about 2 days ago. With significant shaking, tremulousness, sweating, nausea, but no vomiting/ diarrhea, some dizziness and muscle cramping since morning, patient decided to come to emergency department for further evaluation and help. Patient has history of DTs without seizures in the past along with hallucination with alcohol withdrawal but denies anything of the sort at this time. Usually his withdrawals last about 5 days. Patient denies any previous ICU hospitalizations for the same however he had multiple hospitalizations for alcohol withdrawal as mentioned before. Patient was resting quietly from the benzodiazepine IV he had received in the ED. Easily arousable. Started feeling tremulous again. Denied any history of liver disease or cirrhosis before. Interval history / Subjective: Follow up alcohol intoxication, withdrawal.  Patient seen and examined at the bedside. Labs, images and notes reviewed  Discussed with nursing staff, orders reviewed. Plan discussed with patient/Family  Feeling ok. Overnight remained somewhat anxious, with BP on higher side, CIWA around 8. 3 doses of PRN Valium in last 24 hrs. Per day nursing, has some affinity for asking for Valium even when his CIWA is scoring '0'. Encouraged pt to following nursing team's lead for assessing CIWA and do needful for PRN medications based on CIWA rather than asking for it. Discussed the role of scheduled Librium as well to support his withdrawal.   Tolerating CLD well. Willing to advance die to regular today. Assessment & Plan:     # Heavy alcohol use, intoxication with withdrawal symptoms since morning of admission. Last drink 12/18/2020 a.m. Stabilizing  -Admitted to inpatient, telemetry  -CIWA protocol with as needed Valium  -Continue Librium 25 mg 3 times daily and taper once clinically stable  -Will downtitrate Librium as clinical condition improves  -Encourage PO intake  -DC IVF  -Transition banana bag to PO MVI, Folate and Thiamine  -Daily CBC, CMP, Mag, Phos monitoringreplete as needed  -If worsens, low threshold for transfer to ICU for IV Precedex gtt. -CM consult for disposition planning  -Psych consult  -Patient already has a sponsor from [facility] where he moved from recently and is in communication virtually/via phone    # Cocaine abuse, last 2 days ago before admission  -UDS unremarkable  -Counseled for abstinence and cessation.   Patient voiced understanding    # Nicotine dependence, daily 12 PPD smoking  -Nicotine replacement therapy/nicotine patch 21 mg / 24 hours  -Counseled for cessation    #Social distress due to recent divorce  -We will await psychiatry input    #Elevated transaminases, likely alcoholic hepatitis related  -Subtle uptrend of AST/ALT  -Consult for alcohol abstinence  -CMP monitoring  -RUQ ultrasound: Hepatic Steatosis    #Macrocytosis, due to alcohol use  -B12 and folate WNL  -MVI replacement, thiamine, folate replacement p.o. once stable    #Mild acute renal insufficiency, improved  -Better with IVF  -Maintain good hydration and encourage p.o. intake    Code status: Full code  DVT prophylaxis: Lovenox    Care Plan discussed with: Patient/Family and Nurse  Anticipated Disposition: Home w/Family  Anticipated Discharge: Greater than 48 hours     Hospital Problems  Date Reviewed: 9/15/2011          Codes Class Noted POA    Alcohol intoxication ([facility] ICD-10-CM: F73.665  ICD-9-CM: 305.00  12/18/2020 Unknown        Substance abuse ([facility] ICD-10-CM: F19.10  ICD-9-CM: 305.90  12/18/2020 Unknown                Review of Systems:   A comprehensive review of systems was negative except for that written in the HPI. Vital Signs:    Last 24hrs VS reviewed since prior progress note. Most recent are:  Visit Vitals  BP (!) 146/104 (BP 1 Location: Right arm, BP Patient Position: At rest)   Pulse 78   Temp 97.7 °F (36.5 °C)   Resp 18   Wt 73.4 kg (161 lb 13.1 oz)   SpO2 98%   BMI 26.12 kg/m²         Intake/Output Summary (Last 24 hours) at 12/20/2020 0947  Last data filed at 12/20/2020 0409  Gross per 24 hour   Intake 240 ml   Output 1350 ml   Net -1110 ml        Physical Examination:     I had a face to face encounter with this patient and independently examined them on 12/20/2020 as outlined below:          General:          Alert, cooperative, no distress, appears stated age. No much tremulous. Not   anxious  HEENT:           Atraumatic, anicteric sclerae, pink conjunctivae                          No oral ulcers, mucosa dry, throat clear, dentition poor  Neck:               Supple, symmetrical  Lungs:             Clear to auscultation bilaterally.  No Wheezing or Rhonchi. No rales. Chest wall:      No tenderness  No Accessory muscle use. Heart:              Regular  rhythm,  No  murmur   No edema  Abdomen:        Soft, non-tender.  Not distended.  Bowel sounds normal  Extremities:     No cyanosis.  No clubbing,                            Skin turgor normal, Capillary refill normal  Skin:                Not pale.  Not Jaundiced  No rashes. Mild spiders over upper chest  Psych:             Not anxious or agitated. Neurologic:      Alert, moves all extremities, answers questions appropriately and responds to   commands        Data Review:   Review and/or order of clinical lab test  Review and/or order of tests in the radiology section of [facility]  Review and/or order of tests in the medicine section of [address]    Result Date: 12/19/2020  IMPRESSION: Hepatic steatosis . Labs:     Recent Labs     12/19/20  0432 12/18/20  1446   WBC 5.2 6.8   HGB 13.3 15.8   HCT 38.9 47.3    237     Recent Labs     12/19/20  0432 12/18/20  1446    142   K 3.7 3.9   * 105   CO2 26 32   BUN 8 10   CREA 0.88 1.26   GLU 86 107*   CA 7.7* 8.6   MG 1.8 2.3   PHOS 2.6  --      Recent Labs     12/19/20  0432 12/18/20  1446   ALT 85* 114*   AP 84 102   TBILI 0.5 0.2   TP 6.1* 7.6   ALB 3.2* 3.9   GLOB 2.9 3.7     Recent Labs     12/18/20  1446   INR 1.0   PTP 10.4      No results for input(s): FE, TIBC, PSAT, FERR in the last 72 hours. Lab Results   Component Value Date/Time    Folate 9.6 12/18/2020 02:46 PM      No results for input(s): PH, PCO2, PO2 in the last 72 hours. No results for input(s): CPK, CKNDX, TROIQ in the last 72 hours.     No lab exists for component: CPKMB  No results found for: CHOL, CHOLX, CHLST, CHOLV, HDL, HDLP, LDL, LDLC, DLDLP, TGLX, TRIGL, TRIGP, CHHD, CHHDX  No results found for: [facility]  Lab Results   Component Value Date/Time    Color YELLOW/STRAW 12/18/2020 07:34 PM    Appearance CLEAR 12/18/2020 07:34 PM    Specific gravity 1.015 12/18/2020 07:34 PM    pH (UA) 6.0 12/18/2020 07:34 PM    Protein Negative 12/18/2020 07:34 PM    Glucose 100 (A) 12/18/2020 07:34 PM    Ketone Negative 12/18/2020 07:34 PM    Bilirubin Negative 12/18/2020 07:34 PM    Urobilinogen 0.2 12/18/2020 07:34 PM    Nitrites Negative 12/18/2020 07:34 PM Leukocyte Esterase Negative 12/18/2020 07:34 PM         Medications Reviewed:     Current Facility-Administered Medications   Medication Dose Route Frequency    diazePAM (VALIUM) injection 5 mg  5 mg IntraVENous Q1H PRN    diazePAM (VALIUM) injection 10 mg  10 mg IntraVENous Q1H PRN    FLUoxetine (PROzac) capsule 20 mg  20 mg Oral DAILY    [Held by provider] dexmedeTOMidine in 0.9 % NaCl (PRECEDEX) 400 mcg/100 mL (4 mcg/mL) infusion soln  0.1-1.5 mcg/kg/hr IntraVENous TITRATE    sodium chloride (NS) flush 5-40 mL  5-40 mL IntraVENous Q8H    sodium chloride (NS) flush 5-40 mL  5-40 mL IntraVENous PRN    polyethylene glycol (MIRALAX) packet 17 g  17 g Oral DAILY PRN    promethazine (PHENERGAN) tablet 12.5 mg  12.5 mg Oral Q6H PRN    Or    ondansetron (ZOFRAN) injection 4 mg  4 mg IntraVENous Q6H PRN    enoxaparin (LOVENOX) injection 40 mg  40 mg SubCUTAneous DAILY    0.9% sodium chloride 7,313 mL with folic acid 1 mg, thiamine 100 mg, mvi, adult no. 4 with vit K 10 mL infusion   IntraVENous DAILY    prochlorperazine (COMPAZINE) with saline injection 5 mg  5 mg IntraVENous Q6H PRN    chlordiazePOXIDE (LIBRIUM) capsule 25 mg  25 mg Oral TID    0.9% sodium chloride infusion  100 mL/hr IntraVENous CONTINUOUS    nicotine (NICODERM CQ) 21 mg/24 hr patch 1 Patch  1 Patch TransDERmal DAILY     ______________________________________________________________________  EXPECTED LENGTH OF STAY: - - -  ACTUAL LENGTH OF STAY:          2                 Ching Mayberry MD

## 2020-12-21 VITALS
HEART RATE: 89 BPM | HEIGHT: 66 IN | RESPIRATION RATE: 18 BRPM | SYSTOLIC BLOOD PRESSURE: 126 MMHG | TEMPERATURE: 98 F | WEIGHT: 161.82 LBS | BODY MASS INDEX: 26.01 KG/M2 | DIASTOLIC BLOOD PRESSURE: 94 MMHG | OXYGEN SATURATION: 99 %

## 2020-12-21 LAB
ALBUMIN SERPL-MCNC: 3.8 G/DL (ref 3.5–5)
ALBUMIN SERPL-MCNC: 3.9 G/DL (ref 3.5–5)
ALBUMIN/GLOB SERPL: 1.1 {RATIO} (ref 1.1–2.2)
ALBUMIN/GLOB SERPL: 1.2 {RATIO} (ref 1.1–2.2)
ALP SERPL-CCNC: 100 U/L (ref 45–117)
ALP SERPL-CCNC: 98 U/L (ref 45–117)
ALT SERPL-CCNC: 102 U/L (ref 12–78)
ALT SERPL-CCNC: 103 U/L (ref 12–78)
ANION GAP SERPL CALC-SCNC: 6 MMOL/L (ref 5–15)
AST SERPL-CCNC: 66 U/L (ref 15–37)
AST SERPL-CCNC: 71 U/L (ref 15–37)
BASOPHILS # BLD: 0 K/UL (ref 0–0.1)
BASOPHILS NFR BLD: 1 % (ref 0–1)
BILIRUB DIRECT SERPL-MCNC: 0.1 MG/DL (ref 0–0.2)
BILIRUB DIRECT SERPL-MCNC: 0.1 MG/DL (ref 0–0.2)
BILIRUB SERPL-MCNC: 0.4 MG/DL (ref 0.2–1)
BILIRUB SERPL-MCNC: 0.4 MG/DL (ref 0.2–1)
BUN SERPL-MCNC: 11 MG/DL (ref 6–20)
BUN/CREAT SERPL: 11 (ref 12–20)
CALCIUM SERPL-MCNC: 9.3 MG/DL (ref 8.5–10.1)
CHLORIDE SERPL-SCNC: 107 MMOL/L (ref 97–108)
CO2 SERPL-SCNC: 24 MMOL/L (ref 21–32)
COMMENT, HOLDF: NORMAL
CREAT SERPL-MCNC: 0.98 MG/DL (ref 0.7–1.3)
DIFFERENTIAL METHOD BLD: ABNORMAL
EOSINOPHIL # BLD: 0.2 K/UL (ref 0–0.4)
EOSINOPHIL NFR BLD: 4 % (ref 0–7)
ERYTHROCYTE [DISTWIDTH] IN BLOOD BY AUTOMATED COUNT: 13.1 % (ref 11.5–14.5)
GLOBULIN SER CALC-MCNC: 3.2 G/DL (ref 2–4)
GLOBULIN SER CALC-MCNC: 3.6 G/DL (ref 2–4)
GLUCOSE SERPL-MCNC: 95 MG/DL (ref 65–100)
HCT VFR BLD AUTO: 44.9 % (ref 36.6–50.3)
HGB BLD-MCNC: 15.8 G/DL (ref 12.1–17)
IMM GRANULOCYTES # BLD AUTO: 0 K/UL (ref 0–0.04)
IMM GRANULOCYTES NFR BLD AUTO: 1 % (ref 0–0.5)
LYMPHOCYTES # BLD: 2.2 K/UL (ref 0.8–3.5)
LYMPHOCYTES NFR BLD: 38 % (ref 12–49)
MCH RBC QN AUTO: 33.8 PG (ref 26–34)
MCHC RBC AUTO-ENTMCNC: 35.2 G/DL (ref 30–36.5)
MCV RBC AUTO: 96.1 FL (ref 80–99)
MONOCYTES # BLD: 0.6 K/UL (ref 0–1)
MONOCYTES NFR BLD: 10 % (ref 5–13)
NEUTS SEG # BLD: 2.8 K/UL (ref 1.8–8)
NEUTS SEG NFR BLD: 46 % (ref 32–75)
NRBC # BLD: 0 K/UL (ref 0–0.01)
NRBC BLD-RTO: 0 PER 100 WBC
PLATELET # BLD AUTO: 176 K/UL (ref 150–400)
PMV BLD AUTO: 8.5 FL (ref 8.9–12.9)
POTASSIUM SERPL-SCNC: 4.1 MMOL/L (ref 3.5–5.1)
PROT SERPL-MCNC: 7.1 G/DL (ref 6.4–8.2)
PROT SERPL-MCNC: 7.4 G/DL (ref 6.4–8.2)
RBC # BLD AUTO: 4.67 M/UL (ref 4.1–5.7)
SAMPLES BEING HELD,HOLD: NORMAL
SODIUM SERPL-SCNC: 137 MMOL/L (ref 136–145)
WBC # BLD AUTO: 5.8 K/UL (ref 4.1–11.1)

## 2020-12-21 PROCEDURE — 74011250636 HC RX REV CODE- 250/636: Performed by: INTERNAL MEDICINE

## 2020-12-21 PROCEDURE — 36415 COLL VENOUS BLD VENIPUNCTURE: CPT

## 2020-12-21 PROCEDURE — 80076 HEPATIC FUNCTION PANEL: CPT

## 2020-12-21 PROCEDURE — 80048 BASIC METABOLIC PNL TOTAL CA: CPT

## 2020-12-21 PROCEDURE — 74011250637 HC RX REV CODE- 250/637: Performed by: INTERNAL MEDICINE

## 2020-12-21 PROCEDURE — 85025 COMPLETE CBC W/AUTO DIFF WBC: CPT

## 2020-12-21 RX ORDER — CHLORDIAZEPOXIDE HYDROCHLORIDE 10 MG/1
20 CAPSULE, GELATIN COATED ORAL 3 TIMES DAILY
Status: DISCONTINUED | OUTPATIENT
Start: 2020-12-21 | End: 2020-12-21 | Stop reason: HOSPADM

## 2020-12-21 RX ORDER — CHLORDIAZEPOXIDE HYDROCHLORIDE 5 MG/1
CAPSULE, GELATIN COATED ORAL
Qty: 24 CAP | Refills: 0 | Status: SHIPPED | OUTPATIENT
Start: 2020-12-21 | End: 2020-12-26

## 2020-12-21 RX ORDER — IBUPROFEN 200 MG
1 TABLET ORAL DAILY
Qty: 30 PATCH | Refills: 0 | Status: SHIPPED | OUTPATIENT
Start: 2020-12-22 | End: 2021-01-21

## 2020-12-21 RX ORDER — FOLIC ACID 1 MG/1
1 TABLET ORAL DAILY
Qty: 30 TAB | Refills: 0 | Status: SHIPPED | OUTPATIENT
Start: 2020-12-22

## 2020-12-21 RX ORDER — LANOLIN ALCOHOL/MO/W.PET/CERES
100 CREAM (GRAM) TOPICAL DAILY
Qty: 30 TAB | Refills: 0 | Status: SHIPPED | OUTPATIENT
Start: 2020-12-22

## 2020-12-21 RX ORDER — MULTIVITAMIN
1 CAPSULE ORAL DAILY
Qty: 30 CAP | Refills: 0 | Status: SHIPPED | OUTPATIENT
Start: 2020-12-21

## 2020-12-21 RX ADMIN — Medication 100 MG: at 10:35

## 2020-12-21 RX ADMIN — FOLIC ACID 1 MG: 1 TABLET ORAL at 10:35

## 2020-12-21 RX ADMIN — FLUOXETINE 20 MG: 20 CAPSULE ORAL at 10:35

## 2020-12-21 RX ADMIN — CHLORDIAZEPOXIDE HYDROCHLORIDE 20 MG: 10 CAPSULE ORAL at 10:41

## 2020-12-21 RX ADMIN — CHLORDIAZEPOXIDE HYDROCHLORIDE 20 MG: 10 CAPSULE ORAL at 16:31

## 2020-12-21 RX ADMIN — ENOXAPARIN SODIUM 40 MG: 100 INJECTION SUBCUTANEOUS at 10:35

## 2020-12-21 NOTE — PROGRESS NOTES
Bedside shift change report given to Mirela Lawrence RN (oncoming nurse) by Jarad Lawson (offgoing nurse). Report included the following information SBAR, Kardex, Intake/Output, MAR, Recent Results, Med Rec Status and Quality Measures.

## 2020-12-21 NOTE — PROGRESS NOTES
Occupational Therapy  Chart reviewed; cleared for tx. Patient moving around room ad ronan, demonstrates no loss of balance; reports dressing, toileting etc at baseline level; no difficulty. Defer further OT assessment. Will complete order. Please reconsult if further OT needs arise.  Yoan Barbour OTR/L

## 2020-12-21 NOTE — DISCHARGE SUMMARY
Discharge Summary       PATIENT ID: Ally Acuna  MRN: [id number]   YOB: 1980    DATE OF ADMISSION: 12/18/2020  2:32 PM    DATE OF DISCHARGE: 12/21/20   PRIMARY CARE PROVIDER: No primary care provider on file. ATTENDING PHYSICIAN: Mauricio Prescott MD  DISCHARGING PROVIDER: Mauricio Prescott MD    To contact this individual call [phone] and ask the  to page. If unavailable ask to be transferred the Adult Hospitalist Department. CONSULTATIONS: None    PROCEDURES/SURGERIES: * No surgery found *    Admission Summary:   Tiffanie Mendoza a 41 y. o. male otherwise healthy, with history of alcohol and cocaine abuse, sober for past 2 years came today with alcohol relapse for possible withdrawal due to intoxication going on with alcohol abuse for 1 week. Apparently patient had divorce with his wife and for last 5-6 months since he started drinking.  Patient had DTs once about 5 to 6 months ago. Mark Medina started drinking again about a week ago.  Apparently patient has been drinking 25+ drinks per day of wine/liquor and beer with last drink this morning around 9 AM.  Also patient has been using cocaine, last use about 2 days ago.  With significant shaking, tremulousness, sweating, nausea, but no vomiting/ diarrhea, some dizziness and muscle cramping since morning, patient decided to come to emergency department for further evaluation and help. Mark Medina has history of DTs without seizures in the past along with hallucination with alcohol withdrawal but denies anything of the sort at this time.  Usually his withdrawals last about 5 days.  Patient denies any previous ICU hospitalizations for the same however he had multiple hospitalizations for alcohol withdrawal as mentioned before.   Patient was resting quietly from the benzodiazepine IV he had received in the ED. Karel Lash arousable.  Started feeling tremulous again.  Denied any history of liver disease or cirrhosis before.     Interval history / Subjective: Follow up alcohol intoxication, withdrawal.  Patient seen and examined at the bedside. Labs, images and notes reviewed  Discussed with nursing staff, orders reviewed. Plan discussed with patient/Family  Feeling much better. No anxiety, hallucination, seizures. Slept well. Ready to DC home. Does not want to spent night here if possible. No additional overnight and today until now any Valium prn needed per CIWA. [address] COURSE:   # Heavy alcohol use, intoxication with withdrawal symptoms since morning of admission.  Last drink 12/18/2020 a.m. Stabilizing  -Admitted to inpatient, telemetry  -CIWA protocol with as needed Valium  -Taper Librium from 25mg TID to 20mg TID and if tolerated well with controlled CIWA and not needing any prn valium, would DC home with taper  -DC with PO MVI, Folate and Thiamine  -Mother has arranged for outpatient IOP and AA program follow ups and pt assuring to keep the appointments and follow direction and seek help.  -Patient already has a sponsor from [facility] where he moved from recently and is in communication virtually/via phone. He is to continue contact with Sponsor as well.     # Cocaine abuse, last 2 days ago before admission  -UDS unremarkable  -Counseled for abstinence and cessation.   Patient voiced understanding     # Nicotine dependence, daily 12 PPD smoking  -Nicotine replacement therapy/nicotine patch 21 mg / 24 hours  -Will give script for the same at [facility] for cessation     #Social distress due to recent divorce  -Outpt IOP, AA and psych eval.     #Elevated transaminases, likely alcoholic hepatitis related  -Subtle uptrend of AST/ALT  -Consult for alcohol abstinence  -CMP monitoring in 3-5 days with PCP  -RUQ ultrasound: Hepatic Steatosis     #Macrocytosis, due to alcohol use  -B12 and folate WNL  -MVI replacement, thiamine, folate replacement p.o. once stable     #Mild acute renal insufficiency, improved  -Better with IVF  -Maintain good hydration and encourage p.o. intake     Code status: Full code  DVT prophylaxis: Lovenox     Care Plan discussed with: Patient/Family and Nurse  Anticipated Disposition: Home w/Family  Anticipated Discharge: DC home today. DISCHARGE DIAGNOSES / PLAN:      # Heavy alcohol use, intoxication with withdrawal symptoms since morning of admission.  Last drink 12/18/2020 a.m. Stabilized withdrawal symptoms. Managed adequately by PO Librium without any PRN medication. # Cocaine abuse, last use 2 days ago before admission per patient. UDS negative. # Nicotine dependence, daily 12 PPD smoking. DC with nicotin patch  # Social distress  # Elevated transaminases, likely alcoholic hepatitis related. RUQ ultrasound: Hepatic Steatosis  # Macrocytosis, due to alcohol use. B12 and folate WNL  # Mild acute renal insufficiency, improved     ADDITIONAL CARE RECOMMENDATIONS:   Follow up with Cross over clinic to establish with new PCP as info given by CM. Please call to schedule the appointment for post hospital discharge follow up. Follow up with IOP and AA programs as arranged by Patient's mother. · It is important that you take the medication exactly as they are prescribed. · Keep your medication in the bottles provided by the pharmacist and keep a list of the medication names, dosages, and times to be taken in your wallet. · Do not take other medications without consulting your doctor. · No drinking alcohol or driving car or operating machinery if you are on narcotic pain medications. Donot take sedating mediations if you are sleepy or confused.    · Fall Precautions  · Keep Well Hydrated  · Report to your medical provider if you feel you have  developed allergies to medications  · Follow up with your PCP or Consultant for medication adjustments and refills  · Monitor for signs of fevers,chills,bleeding,chest pain and seek medical attention if you do so.          DIET: Regular Diet     ACTIVITY: Activity as tolerated     WOUND CARE: NA     EQUIPMENT needed: NA        PENDING TEST RESULTS:   At the time of discharge the following test results are still pending: none    FOLLOW UP APPOINTMENTS:    Follow-up Information     Follow up With Specialties Details Why Contact Info    PCP  Call As needed, If symptoms worsen and post hospital discharge follow up     [address]   In 3 days As needed, If symptoms worsen and post hospital discharge follow up                DISCHARGE MEDICATIONS:  Current Discharge Medication List      START taking these medications    Details   chlordiazePOXIDE (LIBRIUM) 5 mg capsule Take 4 Caps by mouth three (3) times daily for 1 day, THEN 2 Caps three (3) times daily for 1 day, THEN 1 Cap three (3) times daily for 1 day, THEN 1 Cap two (2) times a day for 1 day, THEN 1 Cap daily for 1 day. Max Daily Amount: 60 mg.  Qty: 24 Cap, Refills: 0    Associated Diagnoses: Alcohol withdrawal syndrome without complication (HCC)      folic acid (FOLVITE) 1 mg tablet Take 1 Tab by mouth daily. Qty: 30 Tab, Refills: 0      nicotine (NICODERM CQ) 21 mg/24 hr 1 Patch by TransDERmal route daily for 30 days. Qty: 30 Patch, Refills: 0      thiamine HCL (B-1) 100 mg tablet Take 1 Tab by mouth daily. Qty: 30 Tab, Refills: 0      multivitamin capsule Take 1 Cap by mouth daily. Qty: 30 Cap, Refills: 0         CONTINUE these medications which have NOT CHANGED    Details   escitalopram oxalate (Lexapro) 20 mg tablet Take 25 mg by mouth daily. dextroamphetamine-amphetamine (AdderalL) 30 mg tablet Take 30 mg by mouth. NOTIFY YOUR PHYSICIAN FOR ANY OF THE FOLLOWING:   Fever over 101 degrees for 24 hours. Chest pain, shortness of breath, fever, chills, nausea, vomiting, diarrhea, change in mentation, falling, weakness, bleeding. Severe pain or pain not relieved by medications.   Or, any other signs or symptoms that you may have questions about.    DISPOSITION:  x  Home With:   OT  PT  HH  RN       Long term SNF/Inpatient Rehab    Independent/assisted living    Hospice    Other:       PATIENT CONDITION AT DISCHARGE:     Functional status    Poor     Deconditioned    x Independent      Cognition    x Lucid     Forgetful     Dementia      Catheters/lines (plus indication)    Mora     PICC     PEG    x None      Code status    x Full code     DNR      PHYSICAL EXAMINATION AT DISCHARGE:  Visit Vitals  BP (!) 126/94   Pulse 89   Temp 98 °F (36.7 °C)   Resp 18   Ht 5' 6\" (1.676 m)   Wt 73.4 kg (161 lb 13.1 oz)   SpO2 99%   BMI 26.12 kg/m²       I had a face to face encounter with this patient and independently examined them on 12/20/2020 as outlined below:                                                   General:          Alert, cooperative, no distress, appears stated age. No    much tremulous. Not anxious  HEENT:           Atraumatic, anicteric sclerae, pink conjunctivae                          No oral ulcers, mucosa dry, throat clear, dentition poor  Neck:               Supple, symmetrical  Lungs:             Clear to auscultation bilaterally.  No Wheezing or Rhonchi. No rales. Chest wall:      No tenderness  No Accessory muscle use. Heart:              Regular  rhythm,  No  murmur   No edema  Abdomen:        Soft, non-tender. Not distended.  Bowel sounds normal  Extremities:     No cyanosis.  No clubbing,                            Skin turgor normal, Capillary refill normal  Skin:                Not pale.  Not Jaundiced  No rashes.  Mild spiders over upper chest  Psych:             Not anxious or agitated.   Neurologic:      Alert, moves all extremities, answers questions appropriately and   responds to commands          [address]:  Problem List as of 12/21/2020 Date Reviewed: 9/15/2011          Codes Class Noted - Resolved    Al[facility] ([facility] ICD-10-CM: Z64.736  ICD-9-CM: 305.00  12/18/2020 - Present        Substance abuse [facility]) ICD-10-CM: F19.10  ICD-9-CM: 305.90  12/18/2020 - Present        ADHD (attention deficit hyperactivity disorder) ICD-10-CM: F90.9  ICD-9-CM: 314.01  9/15/2011 - Present            [facility]    Result Date: 12/19/2020  IMPRESSION: Hepatic steatosis . Recent Results (from the past 24 hour(s))   CBC WITH AUTOMATED DIFF    Collection Time: 12/21/20  4:20 AM   Result Value Ref Range    WBC 5.8 4.1 - 11.1 K/uL    RBC 4.67 4.10 - 5.70 M/uL    HGB 15.8 12.1 - 17.0 g/dL    HCT 44.9 36.6 - 50.3 %    MCV 96.1 80.0 - 99.0 FL    MCH 33.8 26.0 - 34.0 PG    MCHC 35.2 30.0 - 36.5 g/dL    RDW 13.1 11.5 - 14.5 %    PLATELET 429 383 - 403 K/uL    MPV 8.5 (L) 8.9 - 12.9 FL    NRBC 0.0 0  WBC    ABSOLUTE NRBC 0.00 0.00 - 0.01 K/uL    NEUTROPHILS 46 32 - 75 %    LYMPHOCYTES 38 12 - 49 %    MONOCYTES 10 5 - 13 %    EOSINOPHILS 4 0 - 7 %    BASOPHILS 1 0 - 1 %    IMMATURE GRANULOCYTES 1 (H) 0.0 - 0.5 %    ABS. NEUTROPHILS 2.8 1.8 - 8.0 K/UL    ABS. LYMPHOCYTES 2.2 0.8 - 3.5 K/UL    ABS. MONOCYTES 0.6 0.0 - 1.0 K/UL    ABS. EOSINOPHILS 0.2 0.0 - 0.4 K/UL    ABS. BASOPHILS 0.0 0.0 - 0.1 K/UL    ABS. IMM.  GRANS. 0.0 0.00 - 0.04 K/UL    DF AUTOMATED     METABOLIC PANEL, BASIC    Collection Time: 12/21/20  4:20 AM   Result Value Ref Range    Sodium 137 136 - 145 mmol/L    Potassium 4.1 3.5 - 5.1 mmol/L    Chloride 107 97 - 108 mmol/L    CO2 24 21 - 32 mmol/L    Anion gap 6 5 - 15 mmol/L    Glucose 95 65 - 100 mg/dL    BUN 11 6 - 20 MG/DL    Creatinine 0.98 0.70 - 1.30 MG/DL    BUN/Creatinine ratio 11 (L) 12 - 20      GFR est AA >60 >60 ml/min/1.73m2    GFR est non-AA >60 >60 ml/min/1.73m2    Calcium 9.3 8.5 - 10.1 MG/DL   HEPATIC FUNCTION PANEL    Collection Time: 12/21/20  4:20 AM   Result Value Ref Range    Protein, total 7.4 6.4 - 8.2 g/dL    Albumin 3.8 3.5 - 5.0 g/dL    Globulin 3.6 2.0 - 4.0 g/dL    A-G Ratio 1.1 1.1 - 2.2      Bilirubin, total 0.4 0.2 - 1.0 MG/DL    Bilirubin, direct 0.1 0.0 - 0.2 MG/DL Alk. phosphatase 100 45 - 117 U/L    AST (SGOT) 66 (H) 15 - 37 U/L    ALT (SGPT) 103 (H) 12 - 78 U/L   SAMPLES BEING HELD    Collection Time: 12/21/20 11:20 AM   Result Value Ref Range    SAMPLES BEING HELD 1PST     COMMENT        Add-on orders for these samples will be processed based on acceptable specimen integrity and analyte stability, which may vary by analyte. HEPATIC FUNCTION PANEL    Collection Time: 12/21/20 11:20 AM   Result Value Ref Range    Protein, total 7.1 6.4 - 8.2 g/dL    Albumin 3.9 3.5 - 5.0 g/dL    Globulin 3.2 2.0 - 4.0 g/dL    A-G Ratio 1.2 1.1 - 2.2      Bilirubin, total 0.4 0.2 - 1.0 MG/DL    Bilirubin, direct 0.1 0.0 - 0.2 MG/DL    Alk.  phosphatase 98 45 - 117 U/L    AST (SGOT) 71 (H) 15 - 37 U/L    ALT (SGPT) 102 (H) 12 - 78 U/L       Greater than 45 minutes were spent with the patient on counseling and coordination of care    Signed:   Yogi Turner MD  12/21/2020  4:52 PM

## 2020-12-21 NOTE — PROGRESS NOTES
Bedside and Verbal shift change report given to Geri Lubin RN (oncoming nurse) by Wendy Ang RN (offgoing nurse). Report included the following information SBAR, Kardex, Intake/Output, MAR and Recent Results.

## 2020-12-21 NOTE — DISCHARGE INSTRUCTIONS
Discharge Instructions       PATIENT ID: Junaid Sheikh  MRN: [id number]   YOB: 1980    DATE OF ADMISSION: 12/18/2020  2:32 PM    DATE OF DISCHARGE: 12/21/2020    PRIMARY CARE PROVIDER: No primary care provider on file. ATTENDING PHYSICIAN: Rajwinder Luz MD  DISCHARGING PROVIDER: Francie Arceo MD    To contact this individual call [phone] and ask the  to page. If unavailable ask to be transferred the Adult Hospitalist Department. DISCHARGE DIAGNOSES   # Heavy alcohol use, intoxication with withdrawal symptoms since morning of admission.  Last drink 12/18/2020 a.m. Stabilized withdrawal symptoms. Managed adequately by PO Librium without any PRN medication. # Cocaine abuse, last use 2 days ago before admission per patient. UDS negative. # Nicotine dependence, daily 1-2 PPD smoking. DC with nicotin patch  # Social distress  # Elevated transaminases, likely alcoholic hepatitis related. RUQ ultrasound: Hepatic Steatosis  # Macrocytosis, due to alcohol use. B12 and folate WNL  # Mild acute renal insufficiency, improved    CONSULTATIONS: None    PROCEDURES/SURGERIES: * No surgery found *    PENDING TEST RESULTS:   At the time of discharge the following test results are still pending: none    FOLLOW UP APPOINTMENTS:   Follow-up Information     Follow up With Specialties Details Why Contact Info    PCP  Call As needed, If symptoms worsen and post hospital discharge follow up     [address] [id number]   In 3 days As needed, If symptoms worsen and post hospital discharge follow up            ADDITIONAL CARE RECOMMENDATIONS:   Follow up with Cross over clinic to establish with new PCP as info given by CM. Please call to schedule the appointment for post hospital discharge follow up. Follow up with IOP and AA programs as arranged by Patient's mother. · It is important that you take the medication exactly as they are prescribed.    · Keep your medication in the bottles provided by the pharmacist and keep a list of the medication names, dosages, and times to be taken in your wallet. · Do not take other medications without consulting your doctor. · No drinking alcohol or driving car or operating machinery if you are on narcotic pain medications. Donot take sedating mediations if you are sleepy or confused. · Fall Precautions  · Keep Well Hydrated  · Report to your medical provider if you feel you have  developed allergies to medications  · Follow up with your PCP or Consultant for medication adjustments and refills  · Monitor for signs of fevers,chills,bleeding,chest pain and seek medical attention if you do so. DIET: Regular Diet    ACTIVITY: Activity as tolerated    WOUND CARE: NA    EQUIPMENT needed: NA      Radiology:  [address] [facility]    Result Date: 12/19/2020  IMPRESSION: Hepatic steatosis . DISCHARGE MEDICATIONS:   See Medication Reconciliation Form    · It is important that you take the medication exactly as they are prescribed. · Keep your medication in the bottles provided by the pharmacist and keep a list of the medication names, dosages, and times to be taken in your wallet. · Do not take other medications without consulting your doctor. NOTIFY YOUR PHYSICIAN FOR ANY OF THE FOLLOWING:   Fever over 101 degrees for 24 hours. Chest pain, shortness of breath, fever, chills, nausea, vomiting, diarrhea, change in mentation, falling, weakness, bleeding. Severe pain or pain not relieved by medications. Or, any other signs or symptoms that you may have questions about.       DISPOSITION:  x  Home With:   OT  PT  HH  RN       SNF/Inpatient Rehab/LTAC    Independent/assisted living    Hospice    Other:     CDMP Checked:   Yes x     PROBLEM LIST Updated:  Yes x        My Medications      START taking these medications      Instructions Each Dose to Equal Morning Noon Evening Bedtime   chlordiazePOXIDE 5 mg capsule  Commonly known as: LIBRIUM  Start taking on: December 21, 2020    Your last dose was: Your next dose is: Take 4 Caps by mouth three (3) times daily for 1 day, THEN 2 Caps three (3) times daily for 1 day, THEN 1 Cap three (3) times daily for 1 day, THEN 1 Cap two (2) times a day for 1 day, THEN 1 Cap daily for 1 day. Max Daily Amount: 60 mg.                  folic acid 1 mg tablet  Commonly known as: Cyndi Letitia  Start taking on: December 22, 2020    Your last dose was: Your next dose is: Take 1 Tab by mouth daily. 1 mg                 multivitamin capsule    Your last dose was: Your next dose is: Take 1 Cap by mouth daily. 1 Cap                 nicotine 21 mg/24 hr  Commonly known as: Chayito Emma  Start taking on: December 22, 2020    Your last dose was: Your next dose is:         1 Patch by TransDERmal route daily for 30 days. 1 Patch                 thiamine  mg tablet  Commonly known as: B-1  Start taking on: December 22, 2020    Your last dose was: Your next dose is: Take 1 Tab by mouth daily. 100 mg                    CONTINUE taking these medications      Instructions Each Dose to Equal Morning Noon Evening Bedtime   AdderalL 30 mg tablet  Generic drug: dextroamphetamine-amphetamine    Your last dose was: Your next dose is: Take 30 mg by mouth. 30 mg                 Lexapro 20 mg tablet  Generic drug: escitalopram oxalate    Your last dose was: Your next dose is: Take 25 mg by mouth daily. 25 mg                       Where to Get Your Medications      Information on where to get these meds will be given to you by the nurse or doctor.     Ask your nurse or doctor about these medications  · chlordiazePOXIDE 5 mg capsule  · folic acid 1 mg tablet  · multivitamin capsule  · nicotine 21 mg/24 hr  · thiamine  mg tablet         Signed:   Néstor Hansen MD  12/21/2020  4:45 PM

## 2020-12-21 NOTE — PROGRESS NOTES
TRANSFER - OUT REPORT:    Verbal report given to Cresenciano Bosworth on [facility]  being transferred to [facility] for routine progression of care         Report consisted of patients Situation, Background, Assessment and   Recommendations(SBAR). Information from the following report(s) SBAR, Kardex, Intake/Output, MAR and Cardiac Rhythm NSR was reviewed with the receiving nurse. Lines:   Peripheral IV 12/18/20 Left Antecubital (Active)   Site Assessment Clean, dry, & intact 12/20/20 0830   Phlebitis Assessment 0 12/20/20 0830   Infiltration Assessment 0 12/20/20 0830   Dressing Status Clean, dry, & intact 12/20/20 0830   Dressing Type Transparent 12/20/20 0830   Hub Color/Line Status Blue 12/20/20 0830   Action Taken Open ports on tubing capped 12/19/20 0401   Alcohol Cap Used Yes 12/19/20 1629        Opportunity for questions and clarification was provided.       Patient transported with:   [employer]

## 2020-12-21 NOTE — PROGRESS NOTES
Patient is anxious to be discharged. Has removed telemetry at this time. Patient is getting dressed and planning to leave, patient stated he would like to leave by going through the appropriate channels but may also leave if not discharged. This RN spoke to patient about risks associated with leaving AMA, and patient calmed down and settled in to await discharge    Discharge teaching completed, opportunity for questions and clarification provided. Risks associated with relapsed while taking patient's medications reviewed. Patient had no further questions at time of discharge.

## 2020-12-21 NOTE — PROGRESS NOTES
Transitions of Care plan: Home with family assist    Reason for Admission:   Alcohol intoxication                   RUR Score:   11%                  Plan for utilizing home health:    Not needed at this time      PCP: First and Last name:  Does not have one, stated his mother is working on getting him one   Name of Practice:    Are you a current patient: Yes/No:    Approximate date of last visit:    Can you participate in a virtual visit with your PCP:                     Current Advanced Directive/Advance Care Plan:                          Transition of Care Plan:    CM met with patient to explain role and offer support. Patient is alert and oriented x4, confirmed demographics. He recently moved from [place] to [place] to temporarily live with his mother. Patient is unemployed due to [place], uninsured. He has been screened by [facility] for Medicaid and it is pending at this time. Patient discussed with CM his recent relapse and the plan for maintaining sobriety once he leaves the hospital. Patient's mother is arranging IOP for him and he is planning to resume AA meetings as well. CM will provide patient with local meetings so that he can attend. Care Management Interventions  PCP Verified by CM: Yes  Palliative Care Criteria Met (RRAT>21 & CHF Dx)?: No  Transition of Care Consult (CM Consult): Discharge Planning  MyChart Signup: No  Discharge Durable Medical Equipment: No  Health Maintenance Reviewed: Yes  Physical Therapy Consult: Yes  Occupational Therapy Consult: Yes  Speech Therapy Consult: No  Current Support Network: Relative's Home(currently staying with his mother)  Confirm Follow Up Transport: Family  Discharge Location  Discharge Placement: Home with family assistance    S.  [employer] , ESTRELLITA

## 2020-12-31 ENCOUNTER — HOSPITAL ENCOUNTER (EMERGENCY)
Age: 40
Discharge: HOME | End: 2020-12-31
Attending: EMERGENCY MEDICINE | Admitting: EMERGENCY MEDICINE
Payer: COMMERCIAL

## 2020-12-31 VITALS
TEMPERATURE: 97.4 F | DIASTOLIC BLOOD PRESSURE: 111 MMHG | HEART RATE: 92 BPM | OXYGEN SATURATION: 96 % | RESPIRATION RATE: 16 BRPM | SYSTOLIC BLOOD PRESSURE: 142 MMHG

## 2020-12-31 DIAGNOSIS — F10.930 ALCOHOL WITHDRAWAL SYNDROME WITHOUT COMPLICATION (HCC): Primary | ICD-10-CM

## 2020-12-31 LAB
ALBUMIN SERPL-MCNC: 4.6 G/DL (ref 3.5–5)
ALBUMIN/GLOB SERPL: 1.3 {RATIO} (ref 1.1–2.2)
ALP SERPL-CCNC: 87 U/L (ref 45–117)
ALT SERPL-CCNC: 112 U/L (ref 12–78)
ANION GAP SERPL CALC-SCNC: 3 MMOL/L (ref 5–15)
AST SERPL-CCNC: 71 U/L (ref 15–37)
BASOPHILS # BLD: 0.1 K/UL (ref 0–0.1)
BASOPHILS NFR BLD: 1 % (ref 0–1)
BILIRUB SERPL-MCNC: 0.8 MG/DL (ref 0.2–1)
BUN SERPL-MCNC: 18 MG/DL (ref 6–20)
BUN/CREAT SERPL: 16 (ref 12–20)
CALCIUM SERPL-MCNC: 10.2 MG/DL (ref 8.5–10.1)
CHLORIDE SERPL-SCNC: 103 MMOL/L (ref 97–108)
CO2 SERPL-SCNC: 30 MMOL/L (ref 21–32)
COMMENT, HOLDF: NORMAL
CREAT SERPL-MCNC: 1.12 MG/DL (ref 0.7–1.3)
DIFFERENTIAL METHOD BLD: ABNORMAL
EOSINOPHIL # BLD: 0.1 K/UL (ref 0–0.4)
EOSINOPHIL NFR BLD: 3 % (ref 0–7)
ERYTHROCYTE [DISTWIDTH] IN BLOOD BY AUTOMATED COUNT: 13.9 % (ref 11.5–14.5)
GLOBULIN SER CALC-MCNC: 3.6 G/DL (ref 2–4)
GLUCOSE SERPL-MCNC: 89 MG/DL (ref 65–100)
HCT VFR BLD AUTO: 48 % (ref 36.6–50.3)
HGB BLD-MCNC: 16.5 G/DL (ref 12.1–17)
IMM GRANULOCYTES # BLD AUTO: 0 K/UL (ref 0–0.04)
IMM GRANULOCYTES NFR BLD AUTO: 0 % (ref 0–0.5)
LYMPHOCYTES # BLD: 1.5 K/UL (ref 0.8–3.5)
LYMPHOCYTES NFR BLD: 34 % (ref 12–49)
MCH RBC QN AUTO: 34.5 PG (ref 26–34)
MCHC RBC AUTO-ENTMCNC: 34.4 G/DL (ref 30–36.5)
MCV RBC AUTO: 100.4 FL (ref 80–99)
MONOCYTES # BLD: 0.6 K/UL (ref 0–1)
MONOCYTES NFR BLD: 15 % (ref 5–13)
NEUTS SEG # BLD: 2 K/UL (ref 1.8–8)
NEUTS SEG NFR BLD: 46 % (ref 32–75)
NRBC # BLD: 0 K/UL (ref 0–0.01)
NRBC BLD-RTO: 0 PER 100 WBC
PLATELET # BLD AUTO: 224 K/UL (ref 150–400)
PMV BLD AUTO: 9 FL (ref 8.9–12.9)
POTASSIUM SERPL-SCNC: 4.4 MMOL/L (ref 3.5–5.1)
PROT SERPL-MCNC: 8.2 G/DL (ref 6.4–8.2)
RBC # BLD AUTO: 4.78 M/UL (ref 4.1–5.7)
SAMPLES BEING HELD,HOLD: NORMAL
SODIUM SERPL-SCNC: 136 MMOL/L (ref 136–145)
WBC # BLD AUTO: 4.2 K/UL (ref 4.1–11.1)

## 2020-12-31 PROCEDURE — 85025 COMPLETE CBC W/AUTO DIFF WBC: CPT

## 2020-12-31 PROCEDURE — 99284 EMERGENCY DEPT VISIT MOD MDM: CPT

## 2020-12-31 PROCEDURE — 99283 EMERGENCY DEPT VISIT LOW MDM: CPT

## 2020-12-31 PROCEDURE — 80053 COMPREHEN METABOLIC PANEL: CPT

## 2020-12-31 PROCEDURE — 36415 COLL VENOUS BLD VENIPUNCTURE: CPT

## 2020-12-31 RX ORDER — LORAZEPAM 0.5 MG/1
TABLET ORAL
Qty: 11 TAB | Refills: 0 | Status: SHIPPED | OUTPATIENT
Start: 2020-12-31

## 2020-12-31 NOTE — ED NOTES
Assumed care of pt in [place] bed outside of room 29. Pt in NAD. Reports etoh withdrawal. Last drink yesterday morning. States felt sweaty, shaky overnight. Insomnia and vomiting last night. States symptoms are worse at night and improve during the day. Labs sent, IV inserted. Awaiting further dispo.

## 2020-12-31 NOTE — ED TRIAGE NOTES
Triage: Pt arrives from home with CC of sweating and shaking in the setting of alcohol withdrawal. Pt reports it has been 24 hours since his last drink. Pt was recently admitted here for withdrawal and he relapsed. He reports his family is sending him to a rehabilitation program but it requires medical clearance before hand.

## 2020-12-31 NOTE — ED PROVIDER NOTES
78-year-old male with a history of alcohol dependence presents with shaking and sweating. He states it has been about 24 hours since his last drink. He states that last night he was very shaky and vomiting. He was here recently admitted for alcohol withdrawal and he relapsed. He has been drinking for the past 6 days approximately 15 drinks a day. He is going into a rehabilitation program in the next 3 to 4 days but he requires medical clearance before he goes. He denies any abdominal pain. He is not currently nauseous or vomiting. States that he is a little tremulous but has improved. He has never had an alcohol withdrawal seizure. He lives with his mother. Delirium Tremens (DTS)         Past Medical History:   Diagnosis Date    ADHD (attention deficit hyperactivity disorder) 9/15/2011    Depression     anxiety       Past Surgical History:   Procedure Laterality Date    HX HEENT      ear         No family history on file. Social History     Socioeconomic History    Marital status:      Spouse name: Not on file    Number of children: Not on file    Years of education: Not on file    Highest education level: Not on file   Occupational History    Not on file   Social Needs    Financial resource strain: Not on file    Food insecurity     Worry: Not on file     Inability: Not on file    Transportation needs     Medical: Not on file     Non-medical: Not on file   Tobacco Use    Smoking status: Current Every Day Smoker    Smokeless tobacco: Former User   Substance and Sexual Activity    Alcohol use:  Yes    Drug use: No    Sexual activity: Not on file   Lifestyle    Physical activity     Days per week: Not on file     Minutes per session: Not on file    Stress: Not on file   Relationships    Social connections     Talks on phone: Not on file     Gets together: Not on file     Attends [employer] service: Not on file     Active member of club or organization: Not on file     Attends meetings of clubs or organizations: Not on file     Relationship status: Not on file    Intimate partner violence     Fear of current or ex partner: Not on file     Emotionally abused: Not on file     Physically abused: Not on file     Forced sexual activity: Not on file   Other Topics Concern    Not on file   Social History Narrative    Not on file         ALLERGIES: Sulfa (sulfonamide antibiotics)    Review of Systems   All other systems reviewed and are negative. Vitals:    12/31/20 1256 12/31/20 1426 12/31/20 1521   BP: (!) 147/108 (!) 164/111 (!) 142/111   Pulse: (!) 102 92 92   Resp: 16 15 16   Temp: 97 °F (36.1 °C) 97.4 °F (36.3 °C)    SpO2: 100% 98% 96%            Physical Exam  Vitals signs and nursing note reviewed. Constitutional:       General: He is not in acute distress. HENT:      Head: Normocephalic and atraumatic. Eyes:      General: No scleral icterus. Conjunctiva/sclera: Conjunctivae normal.      Pupils: Pupils are equal, round, and reactive to light. Neck:      Musculoskeletal: No neck rigidity. Trachea: No tracheal deviation. Cardiovascular:      Rate and Rhythm: Normal rate and regular rhythm. Pulmonary:      Effort: Pulmonary effort is normal. No respiratory distress. Breath sounds: Normal breath sounds. No stridor. Abdominal:      General: There is no distension. Palpations: Abdomen is soft. Tenderness: There is no abdominal tenderness. Genitourinary:     Comments: deferred  Musculoskeletal:         General: No deformity. Skin:     General: Skin is warm and dry. Neurological:      General: No focal deficit present. Mental Status: He is alert and oriented to person, place, and time.       Comments: Mildly tremulous   Psychiatric:         Mood and Affect: Mood normal.         Behavior: Behavior normal.          MDM  Number of Diagnoses or Management Options  Alcohol withdrawal syndrome without complication (HCC)  Diagnosis management comments: 40-year-old male with signs and symptoms of alcohol withdrawal.  He is going into a 90-day rehab program in [place] in 3 to 4 days. He was given the option for inpatient treatment versus trial of outpatient benzo taper. Elected for outpatient management. Given short course of Ativan taper. Given return precautions. Procedures        3:59 PM  Patient re-evaluated. All questions answered. Patient appropriate for discharge. Given return precautions and follow up instructions. LABORATORY TESTS:  Labs Reviewed   CBC WITH AUTOMATED DIFF - Abnormal; Notable for the following components:       Result Value    .4 (*)     MCH 34.5 (*)     MONOCYTES 15 (*)     All other components within normal limits   METABOLIC PANEL, COMPREHENSIVE - Abnormal; Notable for the following components:    Anion gap 3 (*)     Calcium 10.2 (*)     ALT (SGPT) 112 (*)     AST (SGOT) 71 (*)     All other components within normal limits   SAMPLES BEING HELD       IMAGING RESULTS:  No orders to display       MEDICATIONS GIVEN:  Medications - No data to display    IMPRESSION:  1. Alcohol withdrawal syndrome without complication ([facility]        PLAN:  1. Current Discharge Medication List      START taking these medications    Details   LORazepam (Ativan) 0.5 mg tablet Take 2 tablets every 8 hours for one day, take 1 tablet every 8 hours every eight hours for next day, take 0.5 tablet every 8 hours for day three. Qty: 11 Tab, Refills: 0    Associated Diagnoses: Alcohol withdrawal syndrome without complication ([facility] [address]           2. Follow-up Information     Follow up With Specialties Details Why Contact Info    [address] [facility]  If symptoms worsen or new concerns [facility] [phone] [id number]  [phone]        3. Return to ED for new or worsening symptoms       Neeraj Wilson.  Yuri Patel MD

## 2022-03-19 PROBLEM — F19.10 SUBSTANCE ABUSE (HCC): Status: ACTIVE | Noted: 2020-12-18

## 2022-03-19 PROBLEM — F10.929 ALCOHOL INTOXICATION (HCC): Status: ACTIVE | Noted: 2020-12-18

## 2023-05-20 RX ORDER — LANOLIN ALCOHOL/MO/W.PET/CERES
100 CREAM (GRAM) TOPICAL DAILY
COMMUNITY
Start: 2020-12-22

## 2023-05-20 RX ORDER — LORAZEPAM 0.5 MG/1
TABLET ORAL
COMMUNITY
Start: 2020-12-31

## 2023-05-20 RX ORDER — FOLIC ACID 1 MG/1
1 TABLET ORAL DAILY
COMMUNITY
Start: 2020-12-22

## 2023-05-20 RX ORDER — DEXTROAMPHETAMINE SACCHARATE, AMPHETAMINE ASPARTATE, DEXTROAMPHETAMINE SULFATE AND AMPHETAMINE SULFATE 7.5; 7.5; 7.5; 7.5 MG/1; MG/1; MG/1; MG/1
30 TABLET ORAL
COMMUNITY

## 2023-05-20 RX ORDER — ESCITALOPRAM OXALATE 20 MG/1
25 TABLET ORAL DAILY
COMMUNITY

## 2025-04-16 ENCOUNTER — HOSPITAL ENCOUNTER (EMERGENCY)
Facility: HOSPITAL | Age: 45
Discharge: HOME | End: 2025-04-16
Attending: STUDENT IN AN ORGANIZED HEALTH CARE EDUCATION/TRAINING PROGRAM
Payer: MEDICAID

## 2025-04-16 VITALS
WEIGHT: 126.98 LBS | TEMPERATURE: 98 F | RESPIRATION RATE: 15 BRPM | HEART RATE: 78 BPM | DIASTOLIC BLOOD PRESSURE: 92 MMHG | HEIGHT: 65 IN | SYSTOLIC BLOOD PRESSURE: 143 MMHG | BODY MASS INDEX: 21.16 KG/M2 | OXYGEN SATURATION: 98 %

## 2025-04-16 DIAGNOSIS — H72.93 PERFORATED TYMPANIC MEMBRANE OF BOTH EARS ON EXAMINATION: Primary | ICD-10-CM

## 2025-04-16 PROCEDURE — 99283 EMERGENCY DEPT VISIT LOW MDM: CPT

## 2025-04-16 RX ORDER — OFLOXACIN 3 MG/ML
5 SOLUTION AURICULAR (OTIC) 2 TIMES DAILY
Qty: 5 ML | Refills: 0 | Status: SHIPPED | OUTPATIENT
Start: 2025-04-16 | End: 2025-04-26

## 2025-04-16 ASSESSMENT — PAIN DESCRIPTION - ORIENTATION: ORIENTATION: RIGHT;LEFT

## 2025-04-16 ASSESSMENT — PAIN DESCRIPTION - LOCATION: LOCATION: EAR

## 2025-04-16 ASSESSMENT — PAIN SCALES - GENERAL: PAINLEVEL_OUTOF10: 5

## 2025-04-16 ASSESSMENT — PAIN - FUNCTIONAL ASSESSMENT: PAIN_FUNCTIONAL_ASSESSMENT: 0-10

## 2025-04-16 NOTE — ED PROVIDER NOTES
[facility] EMERGENCY DEPARTMENT  EMERGENCY DEPARTMENT ENCOUNTER      Pt Name: Severiano Hay  MRN: [id number]  Birthdate 1980  Date of evaluation: 4/16/2025  Provider: Nereyda Benoit PA-C    CHIEF COMPLAINT       Chief Complaint   Patient presents with    Ear Injury         HISTORY OF PRESENT ILLNESS   (Location/Symptom, Timing/Onset, Context/Setting, Quality, Duration, Modifying Factors, Severity)  Note limiting factors.   Patient is a 44-year-old male presenting for concerns of a perforated eardrum.  [facility] and they sent him here for this concern.  States that he usually wears hearing aids but his hearing has been significantly impaired recently and been suffering from an ear infection for which he has taken 2 rounds of amoxicillin and is still on amoxicillin right now.  He denies any bleeding or drainage from the ears.  He states that he is a recovering alcoholic.  Denies any recent fevers or throat pain or any other symptoms at this time. Denies any trauma causing this injury or recent plane trips or scuba diving or swimming.             Review of External Medical Records:     Nursing Notes were reviewed.    REVIEW OF SYSTEMS    (2-9 systems for level 4, 10 or more for level 5)     Review of Systems    Except as noted above the remainder of the review of systems was reviewed and negative.       PAST MEDICAL HISTORY     Past Medical History:   Diagnosis Date    ADHD (attention deficit hyperactivity disorder) 9/15/2011    Depression     anxiety         SURGICAL HISTORY       Past Surgical History:   Procedure Laterality Date    HEENT      ear         CURRENT MEDICATIONS       Discharge Medication List as of 4/16/2025 12:48 PM        CONTINUE these medications which have NOT CHANGED    Details   amphetamine-dextroamphetamine (ADDERALL) 30 MG tablet Take 1 tablet by mouth.Historical Med      escitalopram (LEXAPRO) 20 MG tablet Take 25 mg by mouth

## 2025-04-16 NOTE — DISCHARGE INSTRUCTIONS
Your workup today was significant for bilateral tympanic membrane perforations.  You should continue using your amoxicillin prescription that is oral and you should take these ofloxacin drops and put them in your ears 2 times a day for 10 days.  Avoid any water in the ears even if this means that you have to plug up your ears with cotton balls but water should not get in your ears at all even when showering.  Also avoid putting anything in the ear including your hearing aids.  Keep the ears clean dry and intact as best as possible and use these drops in addition to the oral medication antibiotic that you are on.  Please come back if symptoms persist worsen or change.  Please follow-up with ENT and/or your primary care provider for further investigation and management of this to ensure that this is healing well.  It is expected that this could take anywhere from 2 to 4 weeks to heal.    Thank you for allowing us to provide you with medical care today.  We realize that you have many choices for your emergency care needs.  We thank you for choosing [facility].  Please choose us in the future for any continued health care needs.     The exam and treatment you received in the Emergency Department were for an emergent problem and are not intended as complete care. It is important that you follow up with a doctor, nurse practitioner, or physician assistant for ongoing care. If your symptoms worsen or you do not improve as expected and you are unable to reach your usual health care provider, you should return to the Emergency Department. We are available 24 hours a day.     Please make an appointment with your healthcare provider(s) for follow up of your Emergency Department visit.  Take this sheet with you when you go to your follow-up visit.